# Patient Record
Sex: MALE | Race: WHITE | NOT HISPANIC OR LATINO | Employment: OTHER | URBAN - NONMETROPOLITAN AREA
[De-identification: names, ages, dates, MRNs, and addresses within clinical notes are randomized per-mention and may not be internally consistent; named-entity substitution may affect disease eponyms.]

---

## 2018-06-27 ENCOUNTER — HOSPITAL ENCOUNTER (EMERGENCY)
Facility: HOSPITAL | Age: 33
Discharge: HOME | End: 2018-06-27
Attending: EMERGENCY MEDICINE | Admitting: EMERGENCY MEDICINE

## 2018-06-27 VITALS
WEIGHT: 180 LBS | BODY MASS INDEX: 23.1 KG/M2 | DIASTOLIC BLOOD PRESSURE: 88 MMHG | HEART RATE: 85 BPM | OXYGEN SATURATION: 100 % | SYSTOLIC BLOOD PRESSURE: 131 MMHG | HEIGHT: 74 IN | TEMPERATURE: 98 F | RESPIRATION RATE: 18 BRPM

## 2018-06-27 DIAGNOSIS — T40.1X1A ACCIDENTAL OVERDOSE OF HEROIN, INITIAL ENCOUNTER (HCC): Primary | ICD-10-CM

## 2018-06-27 PROCEDURE — 99284 EMERGENCY DEPT VISIT MOD MDM: CPT

## 2021-07-22 ENCOUNTER — HOSPITAL ENCOUNTER (EMERGENCY)
Facility: HOSPITAL | Age: 36
Discharge: HOME | End: 2021-07-22
Attending: EMERGENCY MEDICINE | Admitting: EMERGENCY MEDICINE

## 2021-07-22 ENCOUNTER — APPOINTMENT (OUTPATIENT)
Dept: GENERAL RADIOLOGY | Facility: HOSPITAL | Age: 36
End: 2021-07-22

## 2021-07-22 VITALS
SYSTOLIC BLOOD PRESSURE: 150 MMHG | WEIGHT: 174.6 LBS | DIASTOLIC BLOOD PRESSURE: 84 MMHG | RESPIRATION RATE: 18 BRPM | HEIGHT: 74 IN | HEART RATE: 93 BPM | BODY MASS INDEX: 22.41 KG/M2 | TEMPERATURE: 98.2 F | OXYGEN SATURATION: 98 %

## 2021-07-22 DIAGNOSIS — S69.92XA HAND INJURY, LEFT, INITIAL ENCOUNTER: Primary | ICD-10-CM

## 2021-07-22 PROCEDURE — 99283 EMERGENCY DEPT VISIT LOW MDM: CPT

## 2021-07-22 PROCEDURE — 73130 X-RAY EXAM OF HAND: CPT

## 2021-07-22 RX ORDER — IBUPROFEN 800 MG/1
800 TABLET ORAL ONCE
Status: DISCONTINUED | OUTPATIENT
Start: 2021-07-22 | End: 2021-07-22

## 2021-07-22 RX ORDER — ACETAMINOPHEN 500 MG
1000 TABLET ORAL ONCE
Status: COMPLETED | OUTPATIENT
Start: 2021-07-22 | End: 2021-07-22

## 2021-07-22 RX ADMIN — ACETAMINOPHEN 1000 MG: 500 TABLET ORAL at 05:08

## 2021-07-22 NOTE — ED PROVIDER NOTES
"Subjective   36-year-old right-hand-dominant male presenting with left hand injury.  He states that about 12 hours ago he was \"messing around\" and fell onto his outstretched left hand.  He complains of pain at the left hand mostly second third metacarpals.  Pain is mild to moderate, worse with movement.  No alleviating factors.  No other injuries or complaints.          Review of Systems   Constitutional: Negative.    HENT: Negative.    Eyes: Negative.    Respiratory: Negative.    Cardiovascular: Negative.    Gastrointestinal: Negative.    Genitourinary: Negative.    Musculoskeletal: Positive for arthralgias.   Skin: Negative.    Neurological: Negative.    Psychiatric/Behavioral: Negative.        History reviewed. No pertinent past medical history.    No Known Allergies    History reviewed. No pertinent surgical history.    History reviewed. No pertinent family history.    Social History     Socioeconomic History   • Marital status: Single     Spouse name: Not on file   • Number of children: Not on file   • Years of education: Not on file   • Highest education level: Not on file   Tobacco Use   • Smoking status: Current Every Day Smoker     Packs/day: 1.00     Types: Cigarettes   Substance and Sexual Activity   • Alcohol use: Yes     Comment: rare   • Drug use: Not Currently     Comment: heroin           Objective   Physical Exam  Vitals reviewed.   Constitutional:       General: He is not in acute distress.     Appearance: Normal appearance. He is not ill-appearing, toxic-appearing or diaphoretic.   HENT:      Head: Normocephalic and atraumatic.      Right Ear: External ear normal.      Left Ear: External ear normal.      Nose: Nose normal.      Mouth/Throat:      Mouth: Mucous membranes are moist.      Pharynx: Oropharynx is clear.   Eyes:      Extraocular Movements: Extraocular movements intact.      Conjunctiva/sclera: Conjunctivae normal.      Pupils: Pupils are equal, round, and reactive to light. "   Cardiovascular:      Rate and Rhythm: Normal rate and regular rhythm.      Pulses: Normal pulses.      Heart sounds: Normal heart sounds.      Comments: 2+ radial pulses  Pulmonary:      Effort: Pulmonary effort is normal. No respiratory distress.      Breath sounds: Normal breath sounds.   Abdominal:      General: Bowel sounds are normal. There is no distension.      Tenderness: There is no abdominal tenderness.   Musculoskeletal:         General: No deformity. Normal range of motion.      Cervical back: Normal range of motion and neck supple.      Comments: Mild swelling and tenderness left second third metacarpals, all other extremities and joints are stable without tenderness   Skin:     General: Skin is warm and dry.      Capillary Refill: Capillary refill takes less than 2 seconds.      Findings: No rash.   Neurological:      General: No focal deficit present.      Mental Status: He is alert and oriented to person, place, and time.      Comments: Normal strength and sensation   Psychiatric:         Mood and Affect: Mood normal.         Behavior: Behavior normal.         Procedures           ED Course                                           MDM  Number of Diagnoses or Management Options  Hand injury, left, initial encounter  Diagnosis management comments: 36-year-old male with fall, hand injury.  Well-developed, well-nourished young man in no distress with exam as above.  His vital signs are normal.  He is neurovascular intact.  Will obtain x-ray and treat pain.  Disposition pending though anticipate discharge home.    DDx: Strain, sprain, fracture    X-ray per radiology is without acute findings.  Will discharge home with orthopedic follow-up if needed.  Supportive measures discussed.      Final diagnoses:   Hand injury, left, initial encounter          Richi Cooney MD  07/22/21 0602

## 2021-12-02 ENCOUNTER — HOSPITAL ENCOUNTER (EMERGENCY)
Facility: HOSPITAL | Age: 36
Discharge: LEFT BEFORE DISCHARGE | End: 2021-12-02
Attending: EMERGENCY MEDICINE | Admitting: EMERGENCY MEDICINE

## 2021-12-02 VITALS
HEIGHT: 74 IN | HEART RATE: 106 BPM | DIASTOLIC BLOOD PRESSURE: 132 MMHG | OXYGEN SATURATION: 95 % | RESPIRATION RATE: 12 BRPM | TEMPERATURE: 98.5 F | SYSTOLIC BLOOD PRESSURE: 145 MMHG | WEIGHT: 173.5 LBS | BODY MASS INDEX: 22.27 KG/M2

## 2021-12-02 DIAGNOSIS — T40.604A OPIATE OVERDOSE, UNDETERMINED INTENT, INITIAL ENCOUNTER (HCC): Primary | ICD-10-CM

## 2021-12-02 LAB
ALBUMIN SERPL-MCNC: 4.5 G/DL (ref 3.5–5.2)
ALBUMIN/GLOB SERPL: 1.3 G/DL
ALP SERPL-CCNC: 82 U/L (ref 39–117)
ALT SERPL W P-5'-P-CCNC: 24 U/L (ref 1–41)
AMPHET+METHAMPHET UR QL: POSITIVE
ANION GAP SERPL CALCULATED.3IONS-SCNC: 10.6 MMOL/L (ref 5–15)
APAP SERPL-MCNC: <5 MCG/ML (ref 0–30)
AST SERPL-CCNC: 23 U/L (ref 1–40)
BARBITURATES UR QL SCN: NEGATIVE
BASOPHILS # BLD AUTO: 0.08 10*3/MM3 (ref 0–0.2)
BASOPHILS NFR BLD AUTO: 0.6 % (ref 0–1.5)
BENZODIAZ UR QL SCN: NEGATIVE
BILIRUB SERPL-MCNC: 0.5 MG/DL (ref 0–1.2)
BUN SERPL-MCNC: 15 MG/DL (ref 6–20)
BUN/CREAT SERPL: 12.2 (ref 7–25)
CALCIUM SPEC-SCNC: 9.3 MG/DL (ref 8.6–10.5)
CANNABINOIDS SERPL QL: NEGATIVE
CHLORIDE SERPL-SCNC: 103 MMOL/L (ref 98–107)
CO2 SERPL-SCNC: 26.4 MMOL/L (ref 22–29)
COCAINE UR QL: NEGATIVE
CREAT SERPL-MCNC: 1.23 MG/DL (ref 0.76–1.27)
DEPRECATED RDW RBC AUTO: 43.8 FL (ref 37–54)
EOSINOPHIL # BLD AUTO: 0.33 10*3/MM3 (ref 0–0.4)
EOSINOPHIL NFR BLD AUTO: 2.3 % (ref 0.3–6.2)
ERYTHROCYTE [DISTWIDTH] IN BLOOD BY AUTOMATED COUNT: 13.3 % (ref 12.3–15.4)
ETHANOL BLD-MCNC: <10 MG/DL (ref 0–10)
ETHANOL UR QL: <0.01 %
GFR SERPL CREATININE-BSD FRML MDRD: 66 ML/MIN/1.73
GLOBULIN UR ELPH-MCNC: 3.4 GM/DL
GLUCOSE BLDC GLUCOMTR-MCNC: 94 MG/DL (ref 70–99)
GLUCOSE SERPL-MCNC: 102 MG/DL (ref 65–99)
HCT VFR BLD AUTO: 40.4 % (ref 37.5–51)
HGB BLD-MCNC: 13.8 G/DL (ref 13–17.7)
HOLD SPECIMEN: NORMAL
HOLD SPECIMEN: NORMAL
IMM GRANULOCYTES # BLD AUTO: 0.09 10*3/MM3 (ref 0–0.05)
IMM GRANULOCYTES NFR BLD AUTO: 0.6 % (ref 0–0.5)
LYMPHOCYTES # BLD AUTO: 5.19 10*3/MM3 (ref 0.7–3.1)
LYMPHOCYTES NFR BLD AUTO: 35.7 % (ref 19.6–45.3)
MCH RBC QN AUTO: 30.7 PG (ref 26.6–33)
MCHC RBC AUTO-ENTMCNC: 34.2 G/DL (ref 31.5–35.7)
MCV RBC AUTO: 89.8 FL (ref 79–97)
METHADONE UR QL SCN: NEGATIVE
MONOCYTES # BLD AUTO: 1.36 10*3/MM3 (ref 0.1–0.9)
MONOCYTES NFR BLD AUTO: 9.4 % (ref 5–12)
NEUTROPHILS NFR BLD AUTO: 51.4 % (ref 42.7–76)
NEUTROPHILS NFR BLD AUTO: 7.49 10*3/MM3 (ref 1.7–7)
NRBC BLD AUTO-RTO: 0 /100 WBC (ref 0–0.2)
OPIATES UR QL: NEGATIVE
OXYCODONE UR QL SCN: NEGATIVE
PLATELET # BLD AUTO: 490 10*3/MM3 (ref 140–450)
PMV BLD AUTO: 8.7 FL (ref 6–12)
POTASSIUM SERPL-SCNC: 3.7 MMOL/L (ref 3.5–5.2)
PROT SERPL-MCNC: 7.9 G/DL (ref 6–8.5)
RBC # BLD AUTO: 4.5 10*6/MM3 (ref 4.14–5.8)
SALICYLATES SERPL-MCNC: <0.3 MG/DL
SODIUM SERPL-SCNC: 140 MMOL/L (ref 136–145)
WBC NRBC COR # BLD: 14.54 10*3/MM3 (ref 3.4–10.8)
WHOLE BLOOD HOLD SPECIMEN: NORMAL
WHOLE BLOOD HOLD SPECIMEN: NORMAL

## 2021-12-02 PROCEDURE — 80179 DRUG ASSAY SALICYLATE: CPT | Performed by: EMERGENCY MEDICINE

## 2021-12-02 PROCEDURE — 80307 DRUG TEST PRSMV CHEM ANLYZR: CPT | Performed by: EMERGENCY MEDICINE

## 2021-12-02 PROCEDURE — 99283 EMERGENCY DEPT VISIT LOW MDM: CPT

## 2021-12-02 PROCEDURE — 80053 COMPREHEN METABOLIC PANEL: CPT | Performed by: EMERGENCY MEDICINE

## 2021-12-02 PROCEDURE — 82962 GLUCOSE BLOOD TEST: CPT

## 2021-12-02 PROCEDURE — 82077 ASSAY SPEC XCP UR&BREATH IA: CPT | Performed by: EMERGENCY MEDICINE

## 2021-12-02 PROCEDURE — 85025 COMPLETE CBC W/AUTO DIFF WBC: CPT | Performed by: EMERGENCY MEDICINE

## 2021-12-02 PROCEDURE — 80143 DRUG ASSAY ACETAMINOPHEN: CPT | Performed by: EMERGENCY MEDICINE

## 2021-12-02 RX ORDER — NALOXONE HYDROCHLORIDE 1 MG/ML
INJECTION INTRAMUSCULAR; INTRAVENOUS; SUBCUTANEOUS
Status: COMPLETED
Start: 2021-12-02 | End: 2021-12-02

## 2021-12-02 RX ORDER — SODIUM CHLORIDE 0.9 % (FLUSH) 0.9 %
10 SYRINGE (ML) INJECTION AS NEEDED
Status: DISCONTINUED | OUTPATIENT
Start: 2021-12-02 | End: 2021-12-02 | Stop reason: HOSPADM

## 2021-12-02 RX ADMIN — NALOXONE HYDROCHLORIDE 2 MG: 1 INJECTION PARENTERAL at 17:32

## 2021-12-02 NOTE — ED NOTES
After narcan patient is awake alert and speaking. Patient admitted to using heroin today     Meli Galan RN  12/02/21 2052

## 2021-12-02 NOTE — ED NOTES
Patient was brought to the EMS bay by a friend. Friend states concern for drug OD     Meli Galan RN  12/02/21 8850

## 2021-12-02 NOTE — ED NOTES
Patient eloped with significant other at approximately 1741. Patient still has 18 gauge IV in LAC. EPD notified     Meli Galan RN  12/02/21 8268

## 2021-12-02 NOTE — ED PROVIDER NOTES
Time: 5:33 PM EST  Arrived by: private car  Chief Complaint: overdose  History provided by: patient  History is limited by: N/A     History of Present Illness:  Patient is a 36 y.o. year old male that presents to the emergency department with an overdose on heroin.  He was dropped off by his friend.  He states that he did do a little bit of heroin.  Friend states that he was not breathing and his eyes would not open.      Drug Overdose  Location:  Home  Severity:  Moderate  Onset quality:  Sudden  Duration:  5 minutes  Timing:  Constant  Progression:  Worsening  Chronicity:  New  Relieved by:  Narcan  Associated symptoms: no abdominal pain, no chest pain, no congestion, no cough, no diarrhea, no fever, no headaches, no myalgias, no nausea, no rhinorrhea, no shortness of breath, no sore throat and no vomiting        Similar Symptoms Previously: no  Recently seen: no      Patient Care Team  Primary Care Provider: Provider, No Known    Past Medical History:     No Known Allergies  History reviewed. No pertinent past medical history.  History reviewed. No pertinent surgical history.  History reviewed. No pertinent family history.    Home Medications:  Prior to Admission medications    Not on File        Social History:   Social History     Tobacco Use   • Smoking status: Current Every Day Smoker     Packs/day: 1.00     Types: Cigarettes   • Smokeless tobacco: Not on file   Substance Use Topics   • Alcohol use: Yes     Comment: rare   • Drug use: Yes     Comment: heroin     Recent travel: no     Review of Systems:  Review of Systems   Constitutional: Negative for chills and fever.   HENT: Negative for congestion, rhinorrhea and sore throat.    Eyes: Negative for pain and visual disturbance.   Respiratory: Negative for apnea, cough, chest tightness and shortness of breath.    Cardiovascular: Negative for chest pain and palpitations.   Gastrointestinal: Negative for abdominal pain, diarrhea, nausea and vomiting.  "  Genitourinary: Negative for difficulty urinating and dysuria.   Musculoskeletal: Negative for joint swelling and myalgias.   Skin: Negative for color change.   Neurological: Negative for seizures and headaches.   Psychiatric/Behavioral: Negative.    All other systems reviewed and are negative.       Physical Exam:  BP (!) 145/132 (BP Location: Right arm, Patient Position: Sitting)   Pulse 106   Temp 98.5 °F (36.9 °C) (Oral)   Resp 12   Ht 188 cm (74\")   Wt 78.7 kg (173 lb 8 oz)   SpO2 95%   BMI 22.28 kg/m²     Physical Exam  Vitals and nursing note reviewed.   Constitutional:       General: He is not in acute distress.     Appearance: Normal appearance. He is not toxic-appearing.   HENT:      Head: Normocephalic and atraumatic.      Jaw: There is normal jaw occlusion.   Eyes:      General: Lids are normal.      Extraocular Movements: Extraocular movements intact.      Conjunctiva/sclera: Conjunctivae normal.      Comments: (+) pupil constriction   Cardiovascular:      Rate and Rhythm: Normal rate and regular rhythm.      Pulses: Normal pulses.      Heart sounds: Normal heart sounds.   Pulmonary:      Effort: Pulmonary effort is normal. No respiratory distress.      Breath sounds: Normal breath sounds. No wheezing or rhonchi.   Abdominal:      General: Abdomen is flat.      Palpations: Abdomen is soft.      Tenderness: There is no abdominal tenderness. There is no guarding or rebound.   Musculoskeletal:         General: Normal range of motion.      Cervical back: Normal range of motion and neck supple.      Right lower leg: No edema.      Left lower leg: No edema.   Skin:     General: Skin is warm and dry.   Neurological:      Mental Status: He is alert and oriented to person, place, and time. Mental status is at baseline.   Psychiatric:         Mood and Affect: Mood normal.                Medications in the Emergency Department:  Medications   sodium chloride 0.9 % flush 10 mL (has no administration in time " range)   Naloxone HCl (NARCAN) 2 MG/2ML injection  - ADS Override Pull (2 mg  Given 12/2/21 1732)        Labs  Lab Results (last 24 hours)     Procedure Component Value Units Date/Time    CBC & Differential [[id number]]  (Abnormal) Collected: 12/02/21 1728    Specimen: Blood Updated: 12/02/21 1735    Narrative:      The following orders were created for panel order CBC & Differential.  Procedure                               Abnormality         Status                     ---------                               -----------         ------                     CBC Auto Differential[[id number]]        Abnormal            Final result                 Please view results for these tests on the individual orders.    Comprehensive Metabolic Panel [[id number]]  (Abnormal) Collected: 12/02/21 1728    Specimen: Blood Updated: 12/02/21 1757     Glucose 102 mg/dL      BUN 15 mg/dL      Creatinine 1.23 mg/dL      Sodium 140 mmol/L      Potassium 3.7 mmol/L      Chloride 103 mmol/L      CO2 26.4 mmol/L      Calcium 9.3 mg/dL      Total Protein 7.9 g/dL      Albumin 4.50 g/dL      ALT (SGPT) 24 U/L      AST (SGOT) 23 U/L      Alkaline Phosphatase 82 U/L      Total Bilirubin 0.5 mg/dL      eGFR Non African Amer 66 mL/min/1.73      Globulin 3.4 gm/dL      A/G Ratio 1.3 g/dL      BUN/Creatinine Ratio 12.2     Anion Gap 10.6 mmol/L     Narrative:      GFR Normal >60  Chronic Kidney Disease <60  Kidney Failure <15      Acetaminophen Level [[id number]]  (Normal) Collected: 12/02/21 1728    Specimen: Blood Updated: 12/02/21 1757     Acetaminophen <5.0 mcg/mL     Ethanol [[id number]] Collected: 12/02/21 1728    Specimen: Blood Updated: 12/02/21 1757     Ethanol <10 mg/dL      Ethanol % <0.010 %     Narrative:      Ethanol (Plasma)  <10 Essentially Negative    Toxic Concentrations           mg/dL    Flushing, slowing of reflexes    Impaired visual activity         Depression of CNS              >100  Possible Coma                   >300       Salicylate Level [[id number]]  (Normal) Collected: 12/02/21 1728    Specimen: Blood Updated: 12/02/21 1757     Salicylate <0.3 mg/dL     CBC Auto Differential [[id number]]  (Abnormal) Collected: 12/02/21 1728    Specimen: Blood Updated: 12/02/21 1735     WBC 14.54 10*3/mm3      RBC 4.50 10*6/mm3      Hemoglobin 13.8 g/dL      Hematocrit 40.4 %      MCV 89.8 fL      MCH 30.7 pg      MCHC 34.2 g/dL      RDW 13.3 %      RDW-SD 43.8 fl      MPV 8.7 fL      Platelets 490 10*3/mm3      Neutrophil % 51.4 %      Lymphocyte % 35.7 %      Monocyte % 9.4 %      Eosinophil % 2.3 %      Basophil % 0.6 %      Immature Grans % 0.6 %      Neutrophils, Absolute 7.49 10*3/mm3      Lymphocytes, Absolute 5.19 10*3/mm3      Monocytes, Absolute 1.36 10*3/mm3      Eosinophils, Absolute 0.33 10*3/mm3      Basophils, Absolute 0.08 10*3/mm3      Immature Grans, Absolute 0.09 10*3/mm3      nRBC 0.0 /100 WBC     POC Glucose Once [[id number]]  (Normal) Collected: 12/02/21 1731    Specimen: Blood Updated: 12/02/21 1732     Glucose 94 mg/dL      Comment: Serial Number: [id number]:  [id number]       Urine Drug Screen - Urine, Clean Catch [[id number]] Collected: 12/02/21 1736    Specimen: Urine, Clean Catch Updated: 12/02/21 1742           Imaging:  No Radiology Exams Resulted Within Past 24 Hours    Procedures:  Procedures    Progress                            Medical Decision Making:  MDM  Number of Diagnoses or Management Options  Opiate overdose, undetermined intent, initial encounter ([facility])  Diagnosis management comments: Patient was given 2 mg of Narcan in the emergency department and had an immediate response with increasing alertness.  Patient has no hypoxia and has no respiratory distress.    This patient has left the emergency department or waiting room with no communication to myself, nursing or administrative staff. There was no opportunity to discuss the patient's decision to leave, provide medical advice or discuss  alternatives to. The staff has made efforts to locate patient without success.    Risk of Complications, Morbidity, and/or Mortality  Presenting problems: high  Management options: high    Patient Progress  Patient progress: stable       Final diagnoses:   Opiate overdose, undetermined intent, initial encounter ([facility])        Disposition:  ED Disposition     None          This medical record created using voice recognition software and may contain unintended errors.           Margareth Antunez MD  12/02/21 3760

## 2023-08-18 ENCOUNTER — APPOINTMENT (OUTPATIENT)
Dept: CT IMAGING | Facility: HOSPITAL | Age: 38
DRG: 917 | End: 2023-08-18

## 2023-08-18 ENCOUNTER — APPOINTMENT (OUTPATIENT)
Dept: GENERAL RADIOLOGY | Facility: HOSPITAL | Age: 38
DRG: 917 | End: 2023-08-18

## 2023-08-18 ENCOUNTER — HOSPITAL ENCOUNTER (INPATIENT)
Facility: HOSPITAL | Age: 38
LOS: 3 days | Discharge: HOME | DRG: 917 | End: 2023-08-21
Attending: EMERGENCY MEDICINE

## 2023-08-18 DIAGNOSIS — N28.9 ACUTE RENAL INSUFFICIENCY: ICD-10-CM

## 2023-08-18 DIAGNOSIS — E87.20 LACTIC ACIDOSIS: ICD-10-CM

## 2023-08-18 DIAGNOSIS — F15.929 METHAMPHETAMINE INTOXICATION: ICD-10-CM

## 2023-08-18 DIAGNOSIS — R41.82 ALTERED MENTAL STATUS, UNSPECIFIED ALTERED MENTAL STATUS TYPE: Primary | ICD-10-CM

## 2023-08-18 LAB
A-A DO2: 466.5 MMHG
A-A DO2: 88.7 MMHG
ALBUMIN SERPL-MCNC: 4.2 G/DL (ref 3.5–5.2)
ALBUMIN SERPL-MCNC: 5.1 G/DL (ref 3.5–5.2)
ALBUMIN/GLOB SERPL: 1.4 G/DL
ALBUMIN/GLOB SERPL: 1.5 G/DL
ALP SERPL-CCNC: 61 U/L (ref 39–117)
ALP SERPL-CCNC: 83 U/L (ref 39–117)
ALT SERPL W P-5'-P-CCNC: 20 U/L (ref 1–41)
ALT SERPL W P-5'-P-CCNC: 26 U/L (ref 1–41)
AMPHET+METHAMPHET UR QL: NEGATIVE
AMPHETAMINES UR QL: POSITIVE
ANION GAP SERPL CALCULATED.3IONS-SCNC: 11 MMOL/L (ref 5–15)
ANION GAP SERPL CALCULATED.3IONS-SCNC: 25.8 MMOL/L (ref 5–15)
APAP SERPL-MCNC: <5 MCG/ML (ref 0–30)
APTT PPP: 21.9 SECONDS (ref 70–100)
ARTERIAL PATENCY WRIST A: ABNORMAL
ARTERIAL PATENCY WRIST A: ABNORMAL
AST SERPL-CCNC: 22 U/L (ref 1–40)
AST SERPL-CCNC: 27 U/L (ref 1–40)
ATMOSPHERIC PRESS: 731 MMHG
ATMOSPHERIC PRESS: 733 MMHG
BACTERIA UR QL AUTO: ABNORMAL /HPF
BARBITURATES UR QL SCN: NEGATIVE
BASE EXCESS BLDA CALC-SCNC: -2.6 MMOL/L (ref 0–2)
BASE EXCESS BLDA CALC-SCNC: -21.4 MMOL/L (ref 0–2)
BASOPHILS # BLD MANUAL: 0.13 10*3/MM3 (ref 0–0.2)
BASOPHILS NFR BLD MANUAL: 1 % (ref 0–1.5)
BDY SITE: ABNORMAL
BDY SITE: ABNORMAL
BENZODIAZ UR QL SCN: NEGATIVE
BILIRUB SERPL-MCNC: 0.2 MG/DL (ref 0–1.2)
BILIRUB SERPL-MCNC: 0.4 MG/DL (ref 0–1.2)
BILIRUB UR QL STRIP: NEGATIVE
BUN SERPL-MCNC: 12 MG/DL (ref 6–20)
BUN SERPL-MCNC: 12 MG/DL (ref 6–20)
BUN/CREAT SERPL: 7.9 (ref 7–25)
BUN/CREAT SERPL: 9.8 (ref 7–25)
BUPRENORPHINE SERPL-MCNC: NEGATIVE NG/ML
CALCIUM SPEC-SCNC: 8.8 MG/DL (ref 8.6–10.5)
CALCIUM SPEC-SCNC: 9.8 MG/DL (ref 8.6–10.5)
CANNABINOIDS SERPL QL: NEGATIVE
CHLORIDE SERPL-SCNC: 106 MMOL/L (ref 98–107)
CHLORIDE SERPL-SCNC: 99 MMOL/L (ref 98–107)
CK SERPL-CCNC: 126 U/L (ref 20–200)
CK SERPL-CCNC: 304 U/L (ref 20–200)
CLARITY UR: ABNORMAL
CO2 SERPL-SCNC: 15.2 MMOL/L (ref 22–29)
CO2 SERPL-SCNC: 22 MMOL/L (ref 22–29)
COCAINE UR QL: NEGATIVE
COHGB MFR BLD: 0.5 % (ref 0–2)
COHGB MFR BLD: 0.5 % (ref 0–2)
COLOR UR: YELLOW
CREAT SERPL-MCNC: 1.23 MG/DL (ref 0.76–1.27)
CREAT SERPL-MCNC: 1.51 MG/DL (ref 0.76–1.27)
D-LACTATE SERPL-SCNC: 1.7 MMOL/L (ref 0.5–2)
D-LACTATE SERPL-SCNC: 12 MMOL/L (ref 0.5–2)
D-LACTATE SERPL-SCNC: 2.4 MMOL/L (ref 0.5–2)
D-LACTATE SERPL-SCNC: 2.8 MMOL/L (ref 0.5–2)
D-LACTATE SERPL-SCNC: 2.9 MMOL/L (ref 0.5–2)
DEPRECATED RDW RBC AUTO: 40.8 FL (ref 37–54)
DEPRECATED RDW RBC AUTO: 41.4 FL (ref 37–54)
EGFRCR SERPLBLD CKD-EPI 2021: 59.5 ML/MIN/1.73
EGFRCR SERPLBLD CKD-EPI 2021: 76.1 ML/MIN/1.73
EOSINOPHIL # BLD MANUAL: 0.27 10*3/MM3 (ref 0–0.4)
EOSINOPHIL NFR BLD MANUAL: 2 % (ref 0.3–6.2)
ERYTHROCYTE [DISTWIDTH] IN BLOOD BY AUTOMATED COUNT: 12 % (ref 12.3–15.4)
ERYTHROCYTE [DISTWIDTH] IN BLOOD BY AUTOMATED COUNT: 12.3 % (ref 12.3–15.4)
ETHANOL BLD-MCNC: 21 MG/DL (ref 0–10)
ETHANOL UR QL: 0.02 %
GLOBULIN UR ELPH-MCNC: 2.8 GM/DL
GLOBULIN UR ELPH-MCNC: 3.7 GM/DL
GLUCOSE BLDC GLUCOMTR-MCNC: 105 MG/DL (ref 70–130)
GLUCOSE BLDC GLUCOMTR-MCNC: 114 MG/DL (ref 70–130)
GLUCOSE BLDC GLUCOMTR-MCNC: 139 MG/DL (ref 70–130)
GLUCOSE SERPL-MCNC: 118 MG/DL (ref 65–99)
GLUCOSE SERPL-MCNC: 211 MG/DL (ref 65–99)
GLUCOSE UR STRIP-MCNC: NEGATIVE MG/DL
HCO3 BLDA-SCNC: 12.9 MMOL/L (ref 22–28)
HCO3 BLDA-SCNC: 23.2 MMOL/L (ref 22–28)
HCT VFR BLD AUTO: 40.9 % (ref 37.5–51)
HCT VFR BLD AUTO: 47.6 % (ref 37.5–51)
HCT VFR BLD CALC: 44.4 %
HCT VFR BLD CALC: 49.4 %
HGB BLD-MCNC: 14 G/DL (ref 13–17.7)
HGB BLD-MCNC: 16 G/DL (ref 13–17.7)
HGB UR QL STRIP.AUTO: ABNORMAL
HYALINE CASTS UR QL AUTO: ABNORMAL /LPF
INHALED O2 CONCENTRATION: 100 %
INHALED O2 CONCENTRATION: 80 %
INR PPP: 1.01 (ref 0.9–1.1)
KETONES UR QL STRIP: ABNORMAL
LEUKOCYTE ESTERASE UR QL STRIP.AUTO: NEGATIVE
LIPASE SERPL-CCNC: 19 U/L (ref 13–60)
LYMPHOCYTES # BLD MANUAL: 5.8 10*3/MM3 (ref 0.7–3.1)
LYMPHOCYTES NFR BLD MANUAL: 5 % (ref 5–12)
Lab: ABNORMAL
MAGNESIUM SERPL-MCNC: 2.4 MG/DL (ref 1.6–2.6)
MCH RBC QN AUTO: 31.1 PG (ref 26.6–33)
MCH RBC QN AUTO: 31.2 PG (ref 26.6–33)
MCHC RBC AUTO-ENTMCNC: 33.6 G/DL (ref 31.5–35.7)
MCHC RBC AUTO-ENTMCNC: 34.2 G/DL (ref 31.5–35.7)
MCV RBC AUTO: 91.1 FL (ref 79–97)
MCV RBC AUTO: 92.4 FL (ref 79–97)
METHADONE UR QL SCN: NEGATIVE
METHGB BLD QL: 0.7 % (ref 0–1.5)
METHGB BLD QL: 0.8 % (ref 0–1.5)
MODALITY: ABNORMAL
MODALITY: ABNORMAL
MONOCYTES # BLD: 0.67 10*3/MM3 (ref 0.1–0.9)
NEUTROPHILS # BLD AUTO: 6.61 10*3/MM3 (ref 1.7–7)
NEUTROPHILS NFR BLD MANUAL: 48 % (ref 42.7–76)
NEUTS BAND NFR BLD MANUAL: 1 % (ref 0–5)
NITRITE UR QL STRIP: NEGATIVE
NOTIFIED BY: ABNORMAL
NOTIFIED WHO: ABNORMAL
OPIATES UR QL: NEGATIVE
OXYCODONE UR QL SCN: NEGATIVE
OXYHGB MFR BLDV: 95.4 % (ref 94–99)
OXYHGB MFR BLDV: 99.5 % (ref 94–99)
PCO2 BLDA: 43 MM HG (ref 35–45)
PCO2 BLDA: 67.8 MM HG (ref 35–45)
PCO2 TEMP ADJ BLD: ABNORMAL MM[HG]
PCO2 TEMP ADJ BLD: ABNORMAL MM[HG]
PCP UR QL SCN: NEGATIVE
PEEP RESPIRATORY: 6 CM[H2O]
PH BLDA: 6.89 PH UNITS (ref 7.35–7.45)
PH BLDA: 7.34 PH UNITS (ref 7.35–7.45)
PH UR STRIP.AUTO: 5.5 [PH] (ref 5–8)
PH, TEMP CORRECTED: ABNORMAL
PH, TEMP CORRECTED: ABNORMAL
PLATELET # BLD AUTO: 270 10*3/MM3 (ref 140–450)
PLATELET # BLD AUTO: 282 10*3/MM3 (ref 140–450)
PMV BLD AUTO: 9.6 FL (ref 6–12)
PMV BLD AUTO: 9.9 FL (ref 6–12)
PO2 BLDA: 150 MM HG (ref 75–100)
PO2 BLDA: 415 MM HG (ref 75–100)
PO2 TEMP ADJ BLD: ABNORMAL MM[HG]
PO2 TEMP ADJ BLD: ABNORMAL MM[HG]
POTASSIUM SERPL-SCNC: 4.2 MMOL/L (ref 3.5–5.2)
POTASSIUM SERPL-SCNC: 4.4 MMOL/L (ref 3.5–5.2)
PROCALCITONIN SERPL-MCNC: 0.04 NG/ML (ref 0–0.25)
PROPOXYPH UR QL: NEGATIVE
PROT SERPL-MCNC: 7 G/DL (ref 6–8.5)
PROT SERPL-MCNC: 8.8 G/DL (ref 6–8.5)
PROT UR QL STRIP: ABNORMAL
PROTHROMBIN TIME: 13.8 SECONDS (ref 12.3–15.1)
RBC # BLD AUTO: 4.49 10*6/MM3 (ref 4.14–5.8)
RBC # BLD AUTO: 5.15 10*6/MM3 (ref 4.14–5.8)
RBC # UR STRIP: ABNORMAL /HPF
RBC MORPH BLD: NORMAL
REF LAB TEST METHOD: ABNORMAL
SALICYLATES SERPL-MCNC: <0.3 MG/DL
SAO2 % BLDCOA: 100.6 % (ref 94–100)
SAO2 % BLDCOA: 96.7 % (ref 94–100)
SCAN SLIDE: NORMAL
SET MECH RESP RATE: 18
SMALL PLATELETS BLD QL SMEAR: ADEQUATE
SODIUM SERPL-SCNC: 139 MMOL/L (ref 136–145)
SODIUM SERPL-SCNC: 140 MMOL/L (ref 136–145)
SP GR UR STRIP: 1.01 (ref 1–1.03)
SPERM URNS QL MICRO: ABNORMAL /HPF
SQUAMOUS #/AREA URNS HPF: ABNORMAL /HPF
TRICYCLICS UR QL SCN: NEGATIVE
TRIGL SERPL-MCNC: 134 MG/DL (ref 0–150)
TSH SERPL DL<=0.05 MIU/L-ACNC: 1.09 UIU/ML (ref 0.27–4.2)
UROBILINOGEN UR QL STRIP: ABNORMAL
VARIANT LYMPHS NFR BLD MANUAL: 43 % (ref 19.6–45.3)
VENTILATOR MODE: ABNORMAL
VENTILATOR MODE: AC
VT ON VENT VENT: 500 ML
WBC # UR STRIP: ABNORMAL /HPF
WBC MORPH BLD: NORMAL
WBC NRBC COR # BLD: 13.48 10*3/MM3 (ref 3.4–10.8)
WBC NRBC COR # BLD: 19.31 10*3/MM3 (ref 3.4–10.8)

## 2023-08-18 PROCEDURE — 83050 HGB METHEMOGLOBIN QUAN: CPT

## 2023-08-18 PROCEDURE — 83735 ASSAY OF MAGNESIUM: CPT | Performed by: FAMILY MEDICINE

## 2023-08-18 PROCEDURE — 72125 CT NECK SPINE W/O DYE: CPT

## 2023-08-18 PROCEDURE — 80053 COMPREHEN METABOLIC PANEL: CPT | Performed by: EMERGENCY MEDICINE

## 2023-08-18 PROCEDURE — 82805 BLOOD GASES W/O2 SATURATION: CPT

## 2023-08-18 PROCEDURE — 25010000002 HEPARIN (PORCINE) PER 1000 UNITS: Performed by: FAMILY MEDICINE

## 2023-08-18 PROCEDURE — 80053 COMPREHEN METABOLIC PANEL: CPT | Performed by: FAMILY MEDICINE

## 2023-08-18 PROCEDURE — 82550 ASSAY OF CK (CPK): CPT | Performed by: FAMILY MEDICINE

## 2023-08-18 PROCEDURE — 51702 INSERT TEMP BLADDER CATH: CPT

## 2023-08-18 PROCEDURE — 82948 REAGENT STRIP/BLOOD GLUCOSE: CPT

## 2023-08-18 PROCEDURE — 94799 UNLISTED PULMONARY SVC/PX: CPT

## 2023-08-18 PROCEDURE — 82550 ASSAY OF CK (CPK): CPT | Performed by: EMERGENCY MEDICINE

## 2023-08-18 PROCEDURE — 87040 BLOOD CULTURE FOR BACTERIA: CPT | Performed by: EMERGENCY MEDICINE

## 2023-08-18 PROCEDURE — 25010000002 CEFTRIAXONE SODIUM-DEXTROSE 1000-3.74 MG-%(50ML) RECONSTITUTED SOLUTION: Performed by: EMERGENCY MEDICINE

## 2023-08-18 PROCEDURE — 99285 EMERGENCY DEPT VISIT HI MDM: CPT

## 2023-08-18 PROCEDURE — 84443 ASSAY THYROID STIM HORMONE: CPT | Performed by: FAMILY MEDICINE

## 2023-08-18 PROCEDURE — 82375 ASSAY CARBOXYHB QUANT: CPT

## 2023-08-18 PROCEDURE — 25010000002 LORAZEPAM PER 2 MG

## 2023-08-18 PROCEDURE — 71045 X-RAY EXAM CHEST 1 VIEW: CPT

## 2023-08-18 PROCEDURE — 36415 COLL VENOUS BLD VENIPUNCTURE: CPT

## 2023-08-18 PROCEDURE — 81001 URINALYSIS AUTO W/SCOPE: CPT | Performed by: EMERGENCY MEDICINE

## 2023-08-18 PROCEDURE — 25010000002 PROPOFOL 1000 MG/100ML EMULSION

## 2023-08-18 PROCEDURE — 70486 CT MAXILLOFACIAL W/O DYE: CPT

## 2023-08-18 PROCEDURE — 70450 CT HEAD/BRAIN W/O DYE: CPT

## 2023-08-18 PROCEDURE — 82077 ASSAY SPEC XCP UR&BREATH IA: CPT | Performed by: EMERGENCY MEDICINE

## 2023-08-18 PROCEDURE — 93005 ELECTROCARDIOGRAM TRACING: CPT | Performed by: EMERGENCY MEDICINE

## 2023-08-18 PROCEDURE — 83690 ASSAY OF LIPASE: CPT | Performed by: EMERGENCY MEDICINE

## 2023-08-18 PROCEDURE — 74176 CT ABD & PELVIS W/O CONTRAST: CPT

## 2023-08-18 PROCEDURE — 25010000002 FENTANYL 10 MCG/1 ML NS: Performed by: EMERGENCY MEDICINE

## 2023-08-18 PROCEDURE — 80306 DRUG TEST PRSMV INSTRMNT: CPT | Performed by: EMERGENCY MEDICINE

## 2023-08-18 PROCEDURE — 85610 PROTHROMBIN TIME: CPT | Performed by: EMERGENCY MEDICINE

## 2023-08-18 PROCEDURE — 85027 COMPLETE CBC AUTOMATED: CPT | Performed by: FAMILY MEDICINE

## 2023-08-18 PROCEDURE — 94761 N-INVAS EAR/PLS OXIMETRY MLT: CPT

## 2023-08-18 PROCEDURE — 84145 PROCALCITONIN (PCT): CPT | Performed by: EMERGENCY MEDICINE

## 2023-08-18 PROCEDURE — 85025 COMPLETE CBC W/AUTO DIFF WBC: CPT | Performed by: EMERGENCY MEDICINE

## 2023-08-18 PROCEDURE — 85730 THROMBOPLASTIN TIME PARTIAL: CPT | Performed by: EMERGENCY MEDICINE

## 2023-08-18 PROCEDURE — 25010000002 FENTANYL 10 MCG/1 ML NS: Performed by: FAMILY MEDICINE

## 2023-08-18 PROCEDURE — 36600 WITHDRAWAL OF ARTERIAL BLOOD: CPT

## 2023-08-18 PROCEDURE — 84478 ASSAY OF TRIGLYCERIDES: CPT

## 2023-08-18 PROCEDURE — 80143 DRUG ASSAY ACETAMINOPHEN: CPT | Performed by: EMERGENCY MEDICINE

## 2023-08-18 PROCEDURE — 0BH17EZ INSERTION OF ENDOTRACHEAL AIRWAY INTO TRACHEA, VIA NATURAL OR ARTIFICIAL OPENING: ICD-10-PCS | Performed by: EMERGENCY MEDICINE

## 2023-08-18 PROCEDURE — 5A1945Z RESPIRATORY VENTILATION, 24-96 CONSECUTIVE HOURS: ICD-10-PCS | Performed by: FAMILY MEDICINE

## 2023-08-18 PROCEDURE — 94002 VENT MGMT INPAT INIT DAY: CPT

## 2023-08-18 PROCEDURE — 80179 DRUG ASSAY SALICYLATE: CPT | Performed by: EMERGENCY MEDICINE

## 2023-08-18 PROCEDURE — 85007 BL SMEAR W/DIFF WBC COUNT: CPT | Performed by: EMERGENCY MEDICINE

## 2023-08-18 PROCEDURE — 99223 1ST HOSP IP/OBS HIGH 75: CPT | Performed by: FAMILY MEDICINE

## 2023-08-18 PROCEDURE — 25010000002 PROPOFOL 10 MG/ML EMULSION: Performed by: FAMILY MEDICINE

## 2023-08-18 PROCEDURE — 83605 ASSAY OF LACTIC ACID: CPT | Performed by: EMERGENCY MEDICINE

## 2023-08-18 RX ORDER — ONDANSETRON 4 MG/1
4 TABLET, FILM COATED ORAL EVERY 6 HOURS PRN
Status: DISCONTINUED | OUTPATIENT
Start: 2023-08-18 | End: 2023-08-18 | Stop reason: ALTCHOICE

## 2023-08-18 RX ORDER — PROPOFOL 10 MG/ML
INJECTION, EMULSION INTRAVENOUS
Status: COMPLETED
Start: 2023-08-18 | End: 2023-08-18

## 2023-08-18 RX ORDER — LORAZEPAM 2 MG/ML
INJECTION INTRAMUSCULAR
Status: COMPLETED
Start: 2023-08-18 | End: 2023-08-18

## 2023-08-18 RX ORDER — ONDANSETRON 2 MG/ML
4 INJECTION INTRAMUSCULAR; INTRAVENOUS EVERY 6 HOURS PRN
Status: DISCONTINUED | OUTPATIENT
Start: 2023-08-18 | End: 2023-08-21 | Stop reason: HOSPADM

## 2023-08-18 RX ORDER — ACETAMINOPHEN 650 MG/1
650 SUPPOSITORY RECTAL EVERY 4 HOURS PRN
Status: DISCONTINUED | OUTPATIENT
Start: 2023-08-18 | End: 2023-08-21 | Stop reason: HOSPADM

## 2023-08-18 RX ORDER — HEPARIN SODIUM 5000 [USP'U]/ML
5000 INJECTION, SOLUTION INTRAVENOUS; SUBCUTANEOUS EVERY 12 HOURS SCHEDULED
Status: DISCONTINUED | OUTPATIENT
Start: 2023-08-18 | End: 2023-08-21 | Stop reason: HOSPADM

## 2023-08-18 RX ORDER — NITROGLYCERIN 0.4 MG/1
0.4 TABLET SUBLINGUAL
Status: DISCONTINUED | OUTPATIENT
Start: 2023-08-18 | End: 2023-08-21 | Stop reason: HOSPADM

## 2023-08-18 RX ORDER — AMOXICILLIN 250 MG
2 CAPSULE ORAL 2 TIMES DAILY
Status: DISCONTINUED | OUTPATIENT
Start: 2023-08-18 | End: 2023-08-18 | Stop reason: ALTCHOICE

## 2023-08-18 RX ORDER — SODIUM CHLORIDE 9 MG/ML
100 INJECTION, SOLUTION INTRAVENOUS CONTINUOUS
Status: DISCONTINUED | OUTPATIENT
Start: 2023-08-18 | End: 2023-08-19

## 2023-08-18 RX ORDER — ETOMIDATE 2 MG/ML
20 INJECTION INTRAVENOUS ONCE
Status: COMPLETED | OUTPATIENT
Start: 2023-08-18 | End: 2023-08-18

## 2023-08-18 RX ORDER — CEFTRIAXONE 1 G/50ML
1000 INJECTION, SOLUTION INTRAVENOUS ONCE
Status: COMPLETED | OUTPATIENT
Start: 2023-08-18 | End: 2023-08-18

## 2023-08-18 RX ORDER — BISACODYL 5 MG/1
5 TABLET, DELAYED RELEASE ORAL DAILY PRN
Status: DISCONTINUED | OUTPATIENT
Start: 2023-08-18 | End: 2023-08-18 | Stop reason: ALTCHOICE

## 2023-08-18 RX ORDER — BISACODYL 10 MG
10 SUPPOSITORY, RECTAL RECTAL DAILY PRN
Status: DISCONTINUED | OUTPATIENT
Start: 2023-08-18 | End: 2023-08-21 | Stop reason: HOSPADM

## 2023-08-18 RX ORDER — NOREPINEPHRINE BITARTRATE/D5W 8 MG/250ML
.02-.3 PLASTIC BAG, INJECTION (ML) INTRAVENOUS
Status: DISCONTINUED | OUTPATIENT
Start: 2023-08-18 | End: 2023-08-19

## 2023-08-18 RX ORDER — LORAZEPAM 2 MG/ML
2 INJECTION INTRAMUSCULAR ONCE
Status: COMPLETED | OUTPATIENT
Start: 2023-08-18 | End: 2023-08-18

## 2023-08-18 RX ORDER — MORPHINE SULFATE 2 MG/ML
2 INJECTION, SOLUTION INTRAMUSCULAR; INTRAVENOUS
Status: DISCONTINUED | OUTPATIENT
Start: 2023-08-18 | End: 2023-08-19

## 2023-08-18 RX ORDER — ROCURONIUM BROMIDE 10 MG/ML
100 INJECTION, SOLUTION INTRAVENOUS ONCE
Status: COMPLETED | OUTPATIENT
Start: 2023-08-18 | End: 2023-08-18

## 2023-08-18 RX ORDER — POLYETHYLENE GLYCOL 3350 17 G/17G
17 POWDER, FOR SOLUTION ORAL DAILY PRN
Status: DISCONTINUED | OUTPATIENT
Start: 2023-08-18 | End: 2023-08-21 | Stop reason: HOSPADM

## 2023-08-18 RX ORDER — SODIUM CHLORIDE 0.9 % (FLUSH) 0.9 %
10 SYRINGE (ML) INJECTION AS NEEDED
Status: DISCONTINUED | OUTPATIENT
Start: 2023-08-18 | End: 2023-08-21 | Stop reason: HOSPADM

## 2023-08-18 RX ORDER — DOCUSATE SODIUM 50 MG/5 ML
100 LIQUID (ML) ORAL 2 TIMES DAILY
Status: DISCONTINUED | OUTPATIENT
Start: 2023-08-18 | End: 2023-08-19

## 2023-08-18 RX ORDER — ACETAMINOPHEN 325 MG/1
650 TABLET ORAL EVERY 4 HOURS PRN
Status: DISCONTINUED | OUTPATIENT
Start: 2023-08-18 | End: 2023-08-21 | Stop reason: HOSPADM

## 2023-08-18 RX ORDER — CHLORHEXIDINE GLUCONATE 0.12 MG/ML
15 RINSE ORAL EVERY 12 HOURS SCHEDULED
Status: DISCONTINUED | OUTPATIENT
Start: 2023-08-18 | End: 2023-08-19

## 2023-08-18 RX ORDER — SODIUM CHLORIDE 0.9 % (FLUSH) 0.9 %
10 SYRINGE (ML) INJECTION EVERY 12 HOURS SCHEDULED
Status: DISCONTINUED | OUTPATIENT
Start: 2023-08-18 | End: 2023-08-21 | Stop reason: HOSPADM

## 2023-08-18 RX ORDER — FAMOTIDINE 10 MG/ML
20 INJECTION, SOLUTION INTRAVENOUS 2 TIMES DAILY
Status: DISCONTINUED | OUTPATIENT
Start: 2023-08-18 | End: 2023-08-19

## 2023-08-18 RX ORDER — SENNOSIDES 8.8 MG/5ML
10 LIQUID ORAL 2 TIMES DAILY
Status: DISCONTINUED | OUTPATIENT
Start: 2023-08-18 | End: 2023-08-18 | Stop reason: ALTCHOICE

## 2023-08-18 RX ORDER — SODIUM CHLORIDE 9 MG/ML
40 INJECTION, SOLUTION INTRAVENOUS AS NEEDED
Status: DISCONTINUED | OUTPATIENT
Start: 2023-08-18 | End: 2023-08-21 | Stop reason: HOSPADM

## 2023-08-18 RX ADMIN — Medication 10 ML: at 20:07

## 2023-08-18 RX ADMIN — Medication 250 MCG/HR: at 20:47

## 2023-08-18 RX ADMIN — LORAZEPAM 2 MG: 2 INJECTION, SOLUTION INTRAMUSCULAR; INTRAVENOUS at 01:24

## 2023-08-18 RX ADMIN — SODIUM CHLORIDE 100 ML/HR: 9 INJECTION, SOLUTION INTRAVENOUS at 20:07

## 2023-08-18 RX ADMIN — SODIUM BICARBONATE 125 MEQ: 84 INJECTION, SOLUTION INTRAVENOUS at 04:09

## 2023-08-18 RX ADMIN — SODIUM CHLORIDE 1000 ML: 9 INJECTION, SOLUTION INTRAVENOUS at 01:35

## 2023-08-18 RX ADMIN — ETOMIDATE 20 MG: 2 INJECTION, SOLUTION INTRAVENOUS at 01:31

## 2023-08-18 RX ADMIN — SENNOSIDES 10 ML: 8.8 LIQUID ORAL at 20:07

## 2023-08-18 RX ADMIN — PROPOFOL 20 MCG/KG/MIN: 10 INJECTION, EMULSION INTRAVENOUS at 01:46

## 2023-08-18 RX ADMIN — Medication 50 MCG/HR: at 02:15

## 2023-08-18 RX ADMIN — FAMOTIDINE 20 MG: 10 INJECTION INTRAVENOUS at 08:22

## 2023-08-18 RX ADMIN — SENNOSIDES 10 ML: 8.8 LIQUID ORAL at 08:23

## 2023-08-18 RX ADMIN — DOCUSATE SODIUM 100 MG: 50 LIQUID ORAL at 08:23

## 2023-08-18 RX ADMIN — SODIUM CHLORIDE 100 ML/HR: 9 INJECTION, SOLUTION INTRAVENOUS at 10:45

## 2023-08-18 RX ADMIN — CEFTRIAXONE 1000 MG: 1 INJECTION, SOLUTION INTRAVENOUS at 02:28

## 2023-08-18 RX ADMIN — ROCURONIUM BROMIDE 100 MG: 10 INJECTION, SOLUTION INTRAVENOUS at 01:32

## 2023-08-18 RX ADMIN — PROPOFOL 30 MCG/KG/MIN: 10 INJECTION, EMULSION INTRAVENOUS at 15:57

## 2023-08-18 RX ADMIN — PROPOFOL 30 MCG/KG/MIN: 10 INJECTION, EMULSION INTRAVENOUS at 08:22

## 2023-08-18 RX ADMIN — LORAZEPAM 2 MG: 2 INJECTION INTRAMUSCULAR at 01:24

## 2023-08-18 RX ADMIN — SODIUM CHLORIDE 1000 ML: 9 INJECTION, SOLUTION INTRAVENOUS at 15:58

## 2023-08-18 RX ADMIN — DOCUSATE SODIUM 100 MG: 50 LIQUID ORAL at 20:07

## 2023-08-18 RX ADMIN — HEPARIN SODIUM 5000 UNITS: 5000 INJECTION INTRAVENOUS; SUBCUTANEOUS at 08:23

## 2023-08-18 RX ADMIN — FAMOTIDINE 20 MG: 10 INJECTION INTRAVENOUS at 20:07

## 2023-08-18 RX ADMIN — PROPOFOL 30 MCG/KG/MIN: 10 INJECTION, EMULSION INTRAVENOUS at 15:37

## 2023-08-18 RX ADMIN — 0.12% CHLORHEXIDINE GLUCONATE 15 ML: 1.2 RINSE ORAL at 20:07

## 2023-08-18 RX ADMIN — Medication 10 ML: at 08:23

## 2023-08-18 RX ADMIN — HEPARIN SODIUM 5000 UNITS: 5000 INJECTION INTRAVENOUS; SUBCUTANEOUS at 20:08

## 2023-08-18 RX ADMIN — NOREPINEPHRINE BITARTRATE 0.02 MCG/KG/MIN: 8 INJECTION, SOLUTION INTRAVENOUS at 18:19

## 2023-08-18 RX ADMIN — PROPOFOL 40 MCG/KG/MIN: 10 INJECTION, EMULSION INTRAVENOUS at 20:07

## 2023-08-18 RX ADMIN — 0.12% CHLORHEXIDINE GLUCONATE 15 ML: 1.2 RINSE ORAL at 08:23

## 2023-08-18 NOTE — CASE MANAGEMENT/SOCIAL WORK
Discharge Planning Assessment  [facility]     Patient Name: Ramon Reeves  MRN: [id number]  Today's Date: 8/18/2023    Admit Date: 8/18/2023    Plan: DCP   Discharge Needs Assessment       Row Name 08/18/23 1111       Living Environment    People in Home parent(s)    Current Living Arrangements home    Potentially Unsafe Housing Conditions none    Primary Care Provided by self    Provides Primary Care For no one    Family Caregiver if Needed parent(s)    Quality of Family Relationships unable to assess    Able to Return to Prior Arrangements yes       Resource/Environmental Concerns    Transportation Concerns none       Transition Planning    Patient/Family Anticipates Transition to home with family    Patient/Family Anticipated Services at Transition     Transportation Anticipated family or friend will provide       Discharge Needs Assessment    Readmission Within the Last 30 Days no previous admission in last 30 days    Equipment Currently Used at Home none    Concerns to be Addressed no discharge needs identified    Anticipated Changes Related to Illness none    Provided Post Acute Provider List? N/A    Provided Post Acute Provider Quality & Resource List? N/A                   Discharge Plan       Row Name 08/18/23 1112       Plan    Plan DCP    Patient/Family in Agreement with Plan yes    Provided Post Acute Provider List? N/A    Provided Post Acute Provider Quality & Resource List? N/A    Plan Comments JOHN met with Pt. and his mother and friend at bedside to complete DCP. Pt. mother completed DCP questions as Pt. is on a vent and unable to respond. Pt. mother stated that Pt. lives at home with her and he is currently on house arrest but Pt. is able to return home with her at discharge. Pt. mother stated that the police are aware that Pt. is a Pt. here in the hospital. Pt. does not have a POA and uses [facility]. Pt. plans for discharge are still pending Pt. progress. JOHN was also notifed that Pt.  does not have insurance from Pt. mother and SW emailed [employer] for them to help assist Pt. and his mother apply for Medicaid. SW/CM will continue to follow for discharge planning.                  Continued Care and Services - Admitted Since 8/18/2023    Coordination has not been started for this encounter.          Demographic Summary       Row Name 08/18/23 1104       General Information    Admission Type inpatient    Arrived From emergency department    Referral Source admission list    Reason for Consult discharge planning    Preferred Language English       Contact Information    Permission Granted to Share Info With     Contact Information Obtained for                    Functional Status       Row Name 08/18/23 1106       Functional Status    Usual Activity Tolerance good    Current Activity Tolerance moderate       Physical Activity    On average, how many days per week do you engage in moderate to strenuous exercise (like a brisk walk)? 0 days    On average, how many minutes do you engage in exercise at this level? 0 min    Number of minutes of exercise per week 0       Assessment of Health Literacy    How often do you have someone help you read hospital materials? Sometimes    How often do you have problems learning about your medical condition because of difficulty understanding written information? Sometimes    How often do you have a problem understanding what is told to you about your medical condition? Sometimes    How confident are you filling out medical forms by yourself? Somewhat    Health Literacy Moderate       Functional Status, IADL    Medications independent    Meal Preparation independent    Housekeeping independent    Laundry independent    Shopping independent       Mental Status Summary    Recent Changes in Mental Status/Cognitive Functioning no changes       Employment/    Employment Status unemployed    Current or Previous Occupation not applicable                    Psychosocial       Row Name 08/18/23 1110       Values/Beliefs    Spiritual, Cultural Beliefs, Alevism Practices, Values that Affect Care no       Coping/Stress    Major Change/Loss/Stressor unable to assess    Sources of Support unable to assess    Reaction to Health Status unable to assess    Understanding of Condition and Treatment unable to assess       Developmental Stage (Eriksson's)    Developmental Stage Stage 7 (35-65 years/Middle Adulthood) Generativity vs. Stagnation                   Abuse/Neglect    No documentation.                  Legal    No documentation.                  Substance Abuse    No documentation.                  Patient Forms    No documentation.                     Lenard Davis

## 2023-08-18 NOTE — CASE MANAGEMENT/SOCIAL WORK
Case Management/Social Work    Patient Name:  Ramon Reeves  YOB: 1985  MRN: [id number]  Admit Date:  8/18/2023      SW called and left a VM for Pt. Mother to complete DCP. SW will call and follow up with Pt. Mother to complete DCP. SW/CM will continue to follow for discharge planning.       Electronically signed by:  Lenard Davis  08/18/23 10:23 EDT

## 2023-08-18 NOTE — H&P
[facility]   HISTORY AND PHYSICAL      Name:  Ramon Reeves   Age:  38 y.o.  Sex:  male  :  1985  MRN:  [id number]   Visit Number:  [id number]  Admission Date:  2023  Date Of Service:  23  Primary Care Physician:  Provider, No Known    Chief Complaint:     Altered mental status, agitation    History Of Presenting Illness:      The patient is an apparent 38-year-old with a history of polysubstance abuse, alcohol use, tobacco use, who had presented via EMS after being found altered mental status and apparently unresponsive.  History obtained primarily from ER record.  Patient currently intubated and on sedation at time of visit.  Per report, EMS had been called out to the home due to patient being found unresponsive by his mother.  She had administered up to 12 mg of intranasal Narcan, followed by 2 more milligrams of Narcan via EMS.  He had presented apparently in distress, agitated, combative, and severely altered.  He was subsequently sedated and intubated and is currently on mechanical ventilation.  RN in the ER and noted mother stated that patient had been found on the floor and appeared to have fallen as well.  Mother also noted that patient may have intentionally took something to harm himself.    His vital signs were demonstrating severe tachycardia, hypertension, and he was febrile up to 102.7.  His labs were notable for elevated creatinine of 1.51 CO2 15.  INR of 1.  Lactic acid was 12.  Ethanol of 0.021.  White blood cell count 13,000 hemoglobin 16 platelets 270 procalcitonin 0.04.  Negative salicylate, acetaminophen.  .  ABG with pH 6.87 PCO2 of 16.8 PO2 150 bicarb 12.9.  Urinalysis with moderate blood and greater than 300 protein.  UDS positive for methamphetamine.  Patient went multiple imaging modalities including CT scan and pelvis with contrast which was unremarkable.  CT maxillofacial without contrast was reportedly unremarkable.  CT scan of the head  without contrast was unremarkable.  CT scan of the cervical spine was unremarkable.    Review Of Systems:    All systems were reviewed and negative except as mentioned in history of presenting illness, assessment and plan.    Past Medical History: Patient  has no past medical history on file.    Past Surgical History: Patient  has no past surgical history on file.    Social History: Patient  reports that he has been smoking cigarettes. He has been smoking an average of 1 pack per day. He does not have any smokeless tobacco history on file. He reports current alcohol use. He reports current drug use.    Family History:  Patient's family history has been reviewed and found to be noncontributory.     Allergies:      Patient has no known allergies.    Home Medications:    Prior to Admission Medications       None          ED Medications:    Medications   propofol (DIPRIVAN) infusion 10 mg/mL 100 mL (20 mcg/kg/min x 77.1 kg Intravenous Currently Infusing 8/18/23 0221)   fentaNYL 2500 mcg/250 mL NS infusion (100 mcg/hr Intravenous Currently Infusing 8/18/23 0221)   sodium bicarbonate 8.4 % 125 mEq in dextrose (D5W) 5 % 1,000 mL infusion (greater than 75 mEq) (125 mEq Intravenous New Bag 8/18/23 0409)   sodium chloride 0.9 % bolus 1,000 mL (0 mL Intravenous Stopped 8/18/23 0205)   LORazepam (ATIVAN) injection 2 mg (2 mg Intravenous Not Given 8/18/23 0335)   etomidate (AMIDATE) injection 20 mg (20 mg Intravenous Given 8/18/23 0131)   rocuronium (ZEMURON) injection 100 mg (100 mg Intravenous Given 8/18/23 0132)   cefTRIAXone (ROCEPHIN) IVPB 1000 mg/50ml dextrose (premix) (0 mg Intravenous Stopped 8/18/23 0258)     Vital Signs:  Temp:  [102.7 øF (39.3 øC)] 102.7 øF (39.3 øC)  Heart Rate:  [101-158] 101  Resp:  [18-20] 20  BP: (112-196)/() 113/84        08/18/23  0143   Weight: 77.1 kg (170 lb)     Body mass index is 21.83 kg/mý.    Physical Exam:     Most recent vital Signs: /84   Pulse 101   Temp (!) 102.7 øF  (39.3 øC) (Axillary)   Resp 20   Wt 77.1 kg (170 lb)   SpO2 98%   BMI 21.83 kg/mý     Physical Exam  Constitutional:       Appearance: He is ill-appearing and diaphoretic.      Comments: Intubated, sedated, ill-appearing   HENT:      Head: Normocephalic and atraumatic.      Nose: Nose normal.      Mouth/Throat:      Mouth: Mucous membranes are dry.   Eyes:      Extraocular Movements: Extraocular movements intact.      Pupils: Pupils are equal, round, and reactive to light.      Comments: Pupils are sluggish but reactive bilaterally and dilated   Cardiovascular:      Rate and Rhythm: Regular rhythm. Tachycardia present.      Pulses: Normal pulses.      Heart sounds: No murmur heard.    No gallop.   Pulmonary:      Breath sounds: Normal breath sounds. No rales.      Comments: ET tube in place  Abdominal:      General: Abdomen is flat. Bowel sounds are normal. There is no distension.      Tenderness: There is no abdominal tenderness. There is no guarding.   Musculoskeletal:         General: No signs of injury.      Right lower leg: No edema.      Left lower leg: No edema.   Skin:     General: Skin is warm.      Capillary Refill: Capillary refill takes less than 2 seconds.      Comments: Multiple tattoos noted on the extremities and chest, none appear new.   Neurological:      Comments: Unable to assess fully.  Currently intubated and sedated.  Per RN was some concern for posturing on arrival.  He was not following commands on arrival       Laboratory data:    I have reviewed the labs done in the emergency room.    Results from last 7 days   Lab Units 08/18/23  0125   SODIUM mmol/L 140   POTASSIUM mmol/L 4.2   CHLORIDE mmol/L 99   CO2 mmol/L 15.2*   BUN mg/dL 12   CREATININE mg/dL 1.51*   CALCIUM mg/dL 9.8   BILIRUBIN mg/dL 0.4   ALK PHOS U/L 83   ALT (SGPT) U/L 26   AST (SGOT) U/L 22   GLUCOSE mg/dL 211*     Results from last 7 days   Lab Units 08/18/23  0125   WBC 10*3/mm3 13.48*   HEMOGLOBIN g/dL 16.0    HEMATOCRIT % 47.6   PLATELETS 10*3/mm3 270     Results from last 7 days   Lab Units 08/18/23  0125   INR  1.01     Results from last 7 days   Lab Units 08/18/23  0125   CK TOTAL U/L 126             Results from last 7 days   Lab Units 08/18/23  0125   LIPASE U/L 19     Results from last 7 days   Lab Units 08/18/23  0154   PH, ARTERIAL pH units 6.887*   PO2 ART mm Hg 150.0*   PCO2, ARTERIAL mm Hg 67.8*   HCO3 ART mmol/L 12.9*     Results from last 7 days   Lab Units 08/18/23  0201   COLOR UA  Yellow   GLUCOSE UA  Negative   KETONES UA  Trace*   LEUKOCYTES UA  Negative   PH, URINE  5.5   BILIRUBIN UA  Negative   UROBILINOGEN UA  0.2 E.U./dL   RBC UA /HPF 3-5*   WBC UA /HPF None Seen       Pain Management Panel  More data may exist         Latest Ref Rng & Units 8/18/2023 12/2/2021   Pain Management Panel   Amphetamine, Urine Qual Negative Negative  -   Barbiturates Screen, Urine Negative Negative  Negative    Benzodiazepine Screen, Urine Negative Negative  Negative    Buprenorphine, Screen, Urine Negative Negative  -   Cocaine Screen, Urine Negative Negative  Negative    Methadone Screen , Urine Negative Negative  Negative    Methamphetamine, Ur Negative Positive  -       EKG:      EKG appears to show a sinus rhythm, rate in the 160 range, no ST elevations.    Radiology:    CT Abdomen Pelvis Without Contrast    Result Date: 8/18/2023  FINAL REPORT TECHNIQUE: null CLINICAL HISTORY: Sepsis COMPARISON: null FINDINGS: CT abdomen and pelvis without contrast Comparison: None Findings: There is mild bibasilar atelectasis. There is a small calcified granuloma in the left lower lobe. The liver, gallbladder, pancreas, spleen, adrenal glands, and kidneys are unremarkable. There is an OG tube in the stomach. The appendix is normal. Remainder of the gastrointestinal tract is unremarkable. There is no free fluid or fluid collection. There  is no free air. Mcgill catheter is in a decompressed bladder. Aorta is normal diameter.  There is no lymphadenopathy. There is mild L5-S1 degenerative disc disease. There is no fracture or suspicious lytic or sclerotic lesion.     Impression: No acute abnormality in the abdomen or pelvis. Authenticated and Electronically Signed by Benjamin Mckay MD on 08/18/2023 04:09:20 AM    CT Head Without Contrast    Result Date: 8/18/2023  FINAL REPORT TECHNIQUE: null CLINICAL HISTORY: overdose, ams, ?posturing COMPARISON: null FINDINGS: CT head without contrast Comparison: CT/SR - CT MAXILLOFACIAL WO CONT - 08/18/2023 02:19 AM EDT Findings: There are prominent perivascular spaces in the lower basal ganglia. There is no acute intracranial hemorrhage. Ventricles are of normal size and shape. No mass effect or midline shift is present. The gray-white matter differentiation appears normal. No fractures are identified.     Impression: No acute intracranial abnormality. Authenticated and Electronically Signed by Benjamin Mckay MD on 08/18/2023 04:00:52 AM    CT Cervical Spine Without Contrast    Result Date: 8/18/2023  FINAL REPORT TECHNIQUE: null CLINICAL HISTORY: fall, unreponsive COMPARISON: null FINDINGS: CT cervical spine without contrast Comparison: None Findings: The alignment is normal. There is no fracture. Disc spaces are preserved. There are multiple small anterior osteophytes. There is no evidence of central canal or neuroforaminal stenosis. There is an endotracheal tube and OG tube.     Impression: No evidence of cervical spine injury. Authenticated and Electronically Signed by Benjamin Mckay MD on 08/18/2023 04:03:56 AM    XR Chest 1 View    Result Date: 8/18/2023  FINAL REPORT TECHNIQUE: null CLINICAL HISTORY: overdose COMPARISON: null FINDINGS: 1 view chest x-ray Comparison: None Findings: Distal tip of the endotracheal tube is 3.6 cm superior to the jarrod. Enteric tube with distal tip and gastric side port overlying the expected location of the stomach lumen. No consolidation or large pleural  effusion. No pneumothorax. Heart size is normal. No acute fracture.     IMPRESSION: 1. Distal tip of the endotracheal tube is 3.6 cm superior to the jarrod. 2. Enteric tube with distal tip/gastric side port overlying the expected location of the stomach lumen. 3. Clear lungs. Authenticated and Electronically Signed by Fortino Last DO on 08/18/2023 02:10:45 AM    CT Maxillofacial Without Contrast    Result Date: 8/18/2023  FINAL REPORT TECHNIQUE: null CLINICAL HISTORY: fall COMPARISON: null FINDINGS: CT maxillofacial without contrast Comparison: None Findings: There is no facial fracture. There is no hematoma. Orbits appear normal. Mastoids are clear. There is mucous and mucosal thickening throughout the paranasal sinuses consistent with sinusitis. There is an endotracheal tube and OG tube. There are multiple absent teeth.     Impression: 1. No facial fracture. 2. Mucous and mucosal thickening throughout the paranasal sinuses consistent with sinusitis. Authenticated and Electronically Signed by Benjamin Mckay MD on 08/18/2023 04:02:31 AM     Assessment:    Altered mental status, POA  Suspected acute methamphetamine overdose, POA  Acute respiratory failure with hypoxia,, POA  BIANCA, POA  Lactic acidosis, POA  Metabolic acidosis, POA  Polysubstance abuse  Alcohol abuse    Plan:    Admit to ICU    Altered mental status/respiratory failure:  Suspect related to methamphetamine toxicity, unsure if any other potential substances involved.  Currently on sedation with fentanyl and propofol.  Mental status will be assessed with weaning of sedation when appropriate.  No obvious infectious process.  Unsure if aspirated prior to arrival, consider further antibiotics.    In addition, unknown exactly how long patient has been in current condition, cannot rule out potential degree of anoxic brain injury possibility as well.    BIANCA:  Complicated with metabolic acidosis likely due to acute illness/drug toxicity.  Receiving IV fluid  resuscitation.  Will avoid nephrotoxic agents.  Placed on sodium bicarb drip for now pending repeat blood gas and BMP.  Urine output monitor closely.    Metabolic acidosis/lactic acidosis:  Most likely due to polysubstance abuse and overdose.  Monitor with treatment    Polysubstance use:  Complicates all expected care.    Lines: Mcgill catheter placed 8/18/2023.  ET tube placed 8/18/2023.  OG tube placed 8/18/2023.    Patient otherwise meets inpatient level of care and anticipate greater than 2 midnight stay.  Further recommendation depend upon clinical course.     Risk Assessment: High  DVT Prophylaxis: Heparin  Code Status: Full  Diet: N.p.o.    Advance Care Planning   ACP discussion was held with the patient during this visit. Patient does not have an advance directive, declines further assistance.           Jessy Wiley DO  08/18/23  04:22 EDT    Dictated utilizing Dragon dictation.

## 2023-08-18 NOTE — ED PROVIDER NOTES
HPI: Ramon Reeves is a 38 y.o. male who presents to the emergency department complaining of overdose, altered mental status.  Apparently patient's mother called because he was found facedown unresponsive.  Per report he received 12 mg of intranasal Narcan by his mother followed by another 2 mg of Narcan by EMS.  Patient arrives here severely agitated, combative and uncooperative.      REVIEW OF SYSTEMS: All other systems reviewed and are negative     PAST MEDICAL HISTORY:   No past medical history on file.     FAMILY HISTORY:   No family history on file.     SOCIAL HISTORY:   Social History     Socioeconomic History    Marital status: Single   Tobacco Use    Smoking status: Every Day     Packs/day: 1.00     Types: Cigarettes   Substance and Sexual Activity    Alcohol use: Yes     Comment: rare    Drug use: Yes     Comment: heroin        SURGICAL HISTORY:   No past surgical history on file.     ALLERGIES: Patient has no known allergies.       PHYSICAL EXAM:   VITAL SIGNS:   Vitals:    08/18/23 0415   BP:    Pulse: 101   Resp: 20   Temp:    SpO2: 98%      CONSTITUTIONAL: Ill-appearing  HENT: Atraumatic, normocephalic, oral mucosa moist, airway patent.  Patient continually spitting bloody sputum.  Nares patent without drainage. External ears normal.   EYES: Conjunctivae clear, EOMI, PERRL   NECK: Trachea midline, nontender, supple   CARDIOVASCULAR: Tachycardic normal rhythm.  PULMONARY/CHEST: Tachypnea. Clear to auscultation, no rhonchi, wheezes, or rales.  ABDOMINAL: Nondistended, soft, nontender, no rebound or guarding.  NEUROLOGIC: Moves all extremities non purposefully, pupils midsize and reactive, patient with intermittent stiffening/posturing of the upper extremities  EXTREMITIES: No clubbing, cyanosis, or edema   SKIN: Warm, Dry, No erythema, No rash       ED COURSE / MEDICAL DECISION MAKING:     Ramon Reeves is a 38 y.o. male who presents to the emergency department for evaluation of apparent overdose.   Ill-appearing man who is combative, agitated.  Vital signs are notable for hypertension, tachycardia, febrile.  Oxygen saturation is normal on room air at 94%.  Will attempt to give patient benzodiazepines for sedation.  We will send labs, chest x-ray and head CT.  We will also start IV fluids.  Disposition pending.    Differential diagnosis includes overdose, intoxication, metabolic derangement, ICH, anoxic brain injury among other etiologies.    EKG interpreted by me: Sinus tachycardia, nonspecific ST/T changes, this is an abnormal EKG    0145  Patient with no improvement after Ativan, became increasingly combative.  Decision made to intubate.  See procedure note below.  Disposition is likely to the hospital.    0420  Lab work notable for mild renal insufficiency, lactic acidosis.  Urine drug screen positive for methamphetamines.  CT imaging reveals no acute findings, mention made of sinusitis.  Discussed with Dr. Wiley for admission.  Patient's mother has been updated.    Intubation procedure note.  Informed consent not obtained due to emergent nature procedure.  Patient preoxygenated with bag valve mask.  Patient was given etomidate and rocuronium for RSI.  Using GlideScope view was obtained of the cords.  8.0 ET tube was then placed through the cords.  There is positive color change on end-tidal CO2 detector.  Patient had bilateral breath sounds.  Tube secured at 24 cm at the lip.  Chest x-ray reveals tube in appropriate position.  Patient tolerated suture well no apparent acute complications.    70 minutes of critical care provided. This time excludes other billable procedures. Time does include preparation of documents, medical consultations, review of old records, and direct bedside care. Patient is at high risk for life-threatening deterioration due to altered mental status, renal insufficiency, lactic acidosis.       Final diagnoses:   Altered mental status, unspecified altered mental status type    Methamphetamine intoxication   Acute renal insufficiency   Lactic acidosis        Richi Cooney MD  08/18/23 1882

## 2023-08-18 NOTE — CONSULTS
"Adult Nutrition  Assessment/PES    Patient Name:  Ramon Reeves  YOB: 1985  MRN: [id number]  Admit Date:  8/18/2023    Assessment Date:  8/18/2023    Comments:     Pt sedated/intubated w/ propofol running @ 13.88mL/hr x 24hrs providing ~366kcals/day. Pt w/ OG-tube and need for enteral nutrition recommendations.     Rec#1: Initiate Peptamen AF @ 20mL/hr and advance 10mL q8hrs to goal rate of 63mL/hr x 24hrs.     Rec#2: Free water flush 140mL q4hrs.     Total TF regimen to provide: 2180kcals, 115g protein, and 2068mL fluid/day.     RD to follow-up.      Reason for Assessment       Row Name 08/18/23 1043          Reason for Assessment    Reason For Assessment identified at risk by screening criteria;per organizational policy;nurse/nurse practitioner consult     Diagnosis substance use/abuse     Identified At Risk by Screening Criteria tube feeding or parenteral nutrition                      Labs/Tests/Procedures/Meds       Row Name 08/18/23 1044          Labs/Procedures/Meds    Lab Results Reviewed reviewed, pertinent        Medications    Pertinent Medications Reviewed reviewed, pertinent     Pertinent Medications Comments propofol, fentanyl                    Physical Findings       Row Name 08/18/23 1044          Physical Findings    Overall Physical Appearance normal wt, intubated/sedated     Enteral Access Devices orogastric tube                    Estimated/Assessed Needs - Anthropometrics       Row Name 08/18/23 1045 08/18/23 0600       Anthropometrics    Weight -- 87.1 kg (192 lb 0.3 oz)    VE (L/min) for Calculation 5 --    Height for Calculation 1.88 m (6' 2\") --    Weight for Calculation 87.1 kg (192 lb) --       Estimated/Assessed Needs    Additional Documentation Modified [place] State Equation (Group);[place] State Equation (Group);Protein Requirements (Group);Fluid Requirements (Group) --       Estimated Calorie Needs    Estimated Calorie Requirement (kcal/day) 2178 --       [place] State Equation "    RMR ([place] State Equation) 2178.67 --       Modified [place] State Equation    Ve (L/Min) for Modified [place] State Equation Calculation 31 --       Protein Requirements    Weight Used For Protein Calculations 87.1 kg (192 lb) --    Est Protein Requirement Amount (gms/kg) 1.2 gm protein --    Estimated Protein Requirements (gms/day) 104.51 --       Fluid Requirements    Fluid Requirements (mL/day) 2178 --    RDA Method (mL) 2178 --                   Nutrition Prescription Ordered       Row Name 08/18/23 1053          Nutrition Prescription PO    Current PO Diet NPO                    Evaluation of Received Nutrient/Fluid Intake       Row Name 08/18/23 1053          Intake Assessment    Energy/Calorie Requirement Assessment not meeting needs     Protein Requirement Assessment not meeting needs                       Problem/Interventions:   Problem 1       Row Name 08/18/23 1053          Nutrition Diagnoses Problem 1    Problem 1 Needs Alternate     Etiology (related to) Functional Diagnosis;Factors Affecting Nutrition     Functional Diagnosis Other (comment)  intubated/sedated     Signs/Symptoms (evidenced by) NPO;Other (comment)  pt w/ OG-tube and need for enteral nutrition recommendations                          Intervention Goal       Row Name 08/18/23 1054          Intervention Goal    General Maintain nutrition;Nutrition support treatment;Improved nutrition related lab(s);Reduce/improve symptoms;Meet nutritional needs for age/condition;Disease management/therapy     TF/PN Establish TF tolerance;Inititiate TF/PN;Maintain TF/PN;Tolerate TF at goal     Weight Maintain weight                    Nutrition Intervention       Row Name 08/18/23 1054          Nutrition Intervention    RD/Tech Action Recommend/ordered;Adjusted supplement;Follow Tx progress;Care plan reviewd     Recommended/Ordered EN                    Nutrition Prescription       Row Name 08/18/23 1054          Nutrition Prescription PO    New PO  Prescription Ordered? No, recommended        Nutrition Prescription EN    Enteral Prescription Enteral begin/change     Enteral Route OG     Product Peptamen AF     TF Delivery Method Continuous     Continuous TF Goal Rate (mL/hr) 63 mL/hr     Continuous TF Starting Rate (mL/hr) 20 mL/hr     Continuous TF Goal Volume (mL) 1512 mL     Continuous TF Starting Volume (mL) 480 mL     Water flush (mL)  140 mL     Water Flush Frequency Every 4 hours     New EN Prescription Ordered? Yes        Other Orders    Obtain Weight Daily     Obtain Weight Ordered? No, recommended     Supplement Vitamin mineral supplement     Supplement Ordered? No, recommended     Labs Mg++;Na+;Phos;K+     Labs Ordered? Yes                    Education/Evaluation       Row Name 08/18/23 4653          Education    Education Education not appropriate at this time     Please explain Patient sedation status        Monitor/Evaluation    Monitor Per protocol;Pertinent labs;TF delivery/tolerance;Weight;Skin status;Symptoms                     Electronically signed by:  Vikas Gudino RD  08/18/23 10:59 EDT

## 2023-08-18 NOTE — PLAN OF CARE
Goal Outcome Evaluation:                 Patient admitted to ICU this AM, patient unable to follow commands, opening eye spontaneously, HR 90s, normal sinus rhythm, patient requiring mechanical ventilation with fiO2 at 80%, propofol and fentanyl infusing for sedation and pain management.

## 2023-08-19 LAB
A-A DO2: ABNORMAL
ANION GAP SERPL CALCULATED.3IONS-SCNC: 8.9 MMOL/L (ref 5–15)
ARTERIAL PATENCY WRIST A: ABNORMAL
ATMOSPHERIC PRESS: 736 MMHG
BASE EXCESS BLDA CALC-SCNC: -2.8 MMOL/L (ref 0–2)
BASOPHILS # BLD AUTO: 0.06 10*3/MM3 (ref 0–0.2)
BASOPHILS NFR BLD AUTO: 0.5 % (ref 0–1.5)
BDY SITE: ABNORMAL
BUN SERPL-MCNC: 11 MG/DL (ref 6–20)
BUN/CREAT SERPL: 8.8 (ref 7–25)
CALCIUM SPEC-SCNC: 8.2 MG/DL (ref 8.6–10.5)
CHLORIDE SERPL-SCNC: 110 MMOL/L (ref 98–107)
CO2 SERPL-SCNC: 22.1 MMOL/L (ref 22–29)
COHGB MFR BLD: 0.6 % (ref 0–2)
CREAT SERPL-MCNC: 1.25 MG/DL (ref 0.76–1.27)
DEPRECATED RDW RBC AUTO: 44.2 FL (ref 37–54)
EGFRCR SERPLBLD CKD-EPI 2021: 74.7 ML/MIN/1.73
EOSINOPHIL # BLD AUTO: 0.23 10*3/MM3 (ref 0–0.4)
EOSINOPHIL NFR BLD AUTO: 1.8 % (ref 0.3–6.2)
ERYTHROCYTE [DISTWIDTH] IN BLOOD BY AUTOMATED COUNT: 12.7 % (ref 12.3–15.4)
GLUCOSE BLDC GLUCOMTR-MCNC: 127 MG/DL (ref 70–130)
GLUCOSE BLDC GLUCOMTR-MCNC: 92 MG/DL (ref 70–130)
GLUCOSE SERPL-MCNC: 142 MG/DL (ref 65–99)
HCO3 BLDA-SCNC: 24.1 MMOL/L (ref 22–28)
HCT VFR BLD AUTO: 37.1 % (ref 37.5–51)
HCT VFR BLD CALC: 41 %
HGB BLD-MCNC: 12.3 G/DL (ref 13–17.7)
IMM GRANULOCYTES # BLD AUTO: 0.05 10*3/MM3 (ref 0–0.05)
IMM GRANULOCYTES NFR BLD AUTO: 0.4 % (ref 0–0.5)
LYMPHOCYTES # BLD AUTO: 3.1 10*3/MM3 (ref 0.7–3.1)
LYMPHOCYTES NFR BLD AUTO: 24.8 % (ref 19.6–45.3)
MCH RBC QN AUTO: 31.5 PG (ref 26.6–33)
MCHC RBC AUTO-ENTMCNC: 33.2 G/DL (ref 31.5–35.7)
MCV RBC AUTO: 95.1 FL (ref 79–97)
METHGB BLD QL: 0.9 % (ref 0–1.5)
MODALITY: ABNORMAL
MONOCYTES # BLD AUTO: 1.46 10*3/MM3 (ref 0.1–0.9)
MONOCYTES NFR BLD AUTO: 11.7 % (ref 5–12)
NEUTROPHILS NFR BLD AUTO: 60.8 % (ref 42.7–76)
NEUTROPHILS NFR BLD AUTO: 7.61 10*3/MM3 (ref 1.7–7)
NRBC BLD AUTO-RTO: 0 /100 WBC (ref 0–0.2)
OXYHGB MFR BLDV: 97.2 % (ref 94–99)
PCO2 BLDA: 49 MM HG (ref 35–45)
PCO2 TEMP ADJ BLD: ABNORMAL MM[HG]
PH BLDA: 7.3 PH UNITS (ref 7.35–7.45)
PH, TEMP CORRECTED: ABNORMAL
PLATELET # BLD AUTO: 256 10*3/MM3 (ref 140–450)
PMV BLD AUTO: 10 FL (ref 6–12)
PO2 BLDA: 115 MM HG (ref 75–100)
PO2 TEMP ADJ BLD: ABNORMAL MM[HG]
POTASSIUM SERPL-SCNC: 4.3 MMOL/L (ref 3.5–5.2)
PROCALCITONIN SERPL-MCNC: 0.91 NG/ML (ref 0–0.25)
RBC # BLD AUTO: 3.9 10*6/MM3 (ref 4.14–5.8)
SAO2 % BLDCOA: 98.7 % (ref 94–100)
SODIUM SERPL-SCNC: 141 MMOL/L (ref 136–145)
VENTILATOR MODE: ABNORMAL
WBC NRBC COR # BLD: 12.51 10*3/MM3 (ref 3.4–10.8)

## 2023-08-19 PROCEDURE — 25010000002 VANCOMYCIN 5 G RECONSTITUTED SOLUTION: Performed by: INTERNAL MEDICINE

## 2023-08-19 PROCEDURE — 94799 UNLISTED PULMONARY SVC/PX: CPT

## 2023-08-19 PROCEDURE — 83050 HGB METHEMOGLOBIN QUAN: CPT

## 2023-08-19 PROCEDURE — 82805 BLOOD GASES W/O2 SATURATION: CPT

## 2023-08-19 PROCEDURE — 94003 VENT MGMT INPAT SUBQ DAY: CPT

## 2023-08-19 PROCEDURE — 36600 WITHDRAWAL OF ARTERIAL BLOOD: CPT

## 2023-08-19 PROCEDURE — 25010000002 HEPARIN (PORCINE) PER 1000 UNITS: Performed by: FAMILY MEDICINE

## 2023-08-19 PROCEDURE — 85025 COMPLETE CBC W/AUTO DIFF WBC: CPT | Performed by: INTERNAL MEDICINE

## 2023-08-19 PROCEDURE — 80048 BASIC METABOLIC PNL TOTAL CA: CPT | Performed by: INTERNAL MEDICINE

## 2023-08-19 PROCEDURE — 25010000002 PROPOFOL 10 MG/ML EMULSION: Performed by: FAMILY MEDICINE

## 2023-08-19 PROCEDURE — 25010000002 VANCOMYCIN 1 G RECONSTITUTED SOLUTION: Performed by: INTERNAL MEDICINE

## 2023-08-19 PROCEDURE — 82375 ASSAY CARBOXYHB QUANT: CPT

## 2023-08-19 PROCEDURE — 0 AMPICILLIN-SULBACTAM PER 1.5 G: Performed by: INTERNAL MEDICINE

## 2023-08-19 PROCEDURE — 84145 PROCALCITONIN (PCT): CPT | Performed by: INTERNAL MEDICINE

## 2023-08-19 PROCEDURE — 25010000002 KETOROLAC TROMETHAMINE PER 15 MG: Performed by: INTERNAL MEDICINE

## 2023-08-19 PROCEDURE — 99233 SBSQ HOSP IP/OBS HIGH 50: CPT | Performed by: INTERNAL MEDICINE

## 2023-08-19 PROCEDURE — 82948 REAGENT STRIP/BLOOD GLUCOSE: CPT

## 2023-08-19 RX ORDER — LORAZEPAM 2 MG/ML
1 INJECTION INTRAMUSCULAR EVERY 4 HOURS PRN
Status: DISCONTINUED | OUTPATIENT
Start: 2023-08-19 | End: 2023-08-21 | Stop reason: HOSPADM

## 2023-08-19 RX ORDER — AMOXICILLIN 250 MG
2 CAPSULE ORAL 2 TIMES DAILY
Status: DISCONTINUED | OUTPATIENT
Start: 2023-08-19 | End: 2023-08-21 | Stop reason: HOSPADM

## 2023-08-19 RX ORDER — DOCUSATE SODIUM 100 MG/1
100 CAPSULE, LIQUID FILLED ORAL 2 TIMES DAILY
Status: DISCONTINUED | OUTPATIENT
Start: 2023-08-19 | End: 2023-08-19 | Stop reason: SDUPTHER

## 2023-08-19 RX ORDER — KETOROLAC TROMETHAMINE 30 MG/ML
15 INJECTION, SOLUTION INTRAMUSCULAR; INTRAVENOUS EVERY 6 HOURS PRN
Status: DISCONTINUED | OUTPATIENT
Start: 2023-08-19 | End: 2023-08-21 | Stop reason: HOSPADM

## 2023-08-19 RX ORDER — ACETAMINOPHEN 160 MG/5ML
650 SOLUTION ORAL EVERY 6 HOURS PRN
Status: DISCONTINUED | OUTPATIENT
Start: 2023-08-19 | End: 2023-08-21 | Stop reason: HOSPADM

## 2023-08-19 RX ORDER — VANCOMYCIN 2 GRAM/500 ML IN 0.9 % SODIUM CHLORIDE INTRAVENOUS
2000 ONCE
Status: COMPLETED | OUTPATIENT
Start: 2023-08-19 | End: 2023-08-19

## 2023-08-19 RX ORDER — ACETAMINOPHEN 160 MG/5ML
650 SOLUTION ORAL EVERY 6 HOURS PRN
Status: DISCONTINUED | OUTPATIENT
Start: 2023-08-19 | End: 2023-08-19

## 2023-08-19 RX ADMIN — VANCOMYCIN HYDROCHLORIDE 2000 MG: 500 INJECTION, POWDER, LYOPHILIZED, FOR SOLUTION INTRAVENOUS at 16:51

## 2023-08-19 RX ADMIN — SODIUM CHLORIDE 1000 MG: 900 INJECTION, SOLUTION INTRAVENOUS at 23:52

## 2023-08-19 RX ADMIN — SODIUM CHLORIDE 3 G: 900 INJECTION, SOLUTION INTRAVENOUS at 14:33

## 2023-08-19 RX ADMIN — ACETAMINOPHEN 650 MG: 650 SOLUTION ORAL at 07:58

## 2023-08-19 RX ADMIN — PROPOFOL 40 MCG/KG/MIN: 10 INJECTION, EMULSION INTRAVENOUS at 01:23

## 2023-08-19 RX ADMIN — SODIUM CHLORIDE 3 G: 900 INJECTION, SOLUTION INTRAVENOUS at 20:06

## 2023-08-19 RX ADMIN — ACETAMINOPHEN 650 MG: 650 SOLUTION ORAL at 01:38

## 2023-08-19 RX ADMIN — HEPARIN SODIUM 5000 UNITS: 5000 INJECTION INTRAVENOUS; SUBCUTANEOUS at 09:05

## 2023-08-19 RX ADMIN — PROPOFOL 45 MCG/KG/MIN: 10 INJECTION, EMULSION INTRAVENOUS at 06:19

## 2023-08-19 RX ADMIN — ACETAMINOPHEN 650 MG: 325 TABLET, FILM COATED ORAL at 20:06

## 2023-08-19 RX ADMIN — FAMOTIDINE 20 MG: 10 INJECTION INTRAVENOUS at 09:05

## 2023-08-19 RX ADMIN — PHENOL 1 SPRAY: 1.5 LIQUID ORAL at 09:05

## 2023-08-19 RX ADMIN — KETOROLAC TROMETHAMINE 15 MG: 30 INJECTION, SOLUTION INTRAMUSCULAR; INTRAVENOUS at 23:14

## 2023-08-19 RX ADMIN — Medication 10 ML: at 09:05

## 2023-08-19 RX ADMIN — PHENOL 1 SPRAY: 1.5 LIQUID ORAL at 20:09

## 2023-08-19 RX ADMIN — SENNOSIDES AND DOCUSATE SODIUM 2 TABLET: 50; 8.6 TABLET ORAL at 20:06

## 2023-08-19 RX ADMIN — KETOROLAC TROMETHAMINE 15 MG: 30 INJECTION, SOLUTION INTRAMUSCULAR; INTRAVENOUS at 14:24

## 2023-08-19 RX ADMIN — HEPARIN SODIUM 5000 UNITS: 5000 INJECTION INTRAVENOUS; SUBCUTANEOUS at 20:06

## 2023-08-19 RX ADMIN — ACETAMINOPHEN 650 MG: 325 TABLET, FILM COATED ORAL at 11:51

## 2023-08-19 RX ADMIN — Medication 10 ML: at 20:06

## 2023-08-19 RX ADMIN — SODIUM CHLORIDE 3 G: 900 INJECTION, SOLUTION INTRAVENOUS at 09:05

## 2023-08-19 NOTE — PROGRESS NOTES
[facility]    Name:  Ramon Reeves   Age:  38 y.o.  Sex:  male  :  1985  MRN:  [id number]   Visit Number:  [id number]  Admission Date:  2023  Date Of Service:  23  Primary Care Physician:  Provider, No Known     LOS: 1 day :    Chief Complaint:      Altered mental status, agitation     Subjective:    Patient seen and evaluated this morning.  Sedation has been discontinued.  Patient performing spontaneous breathing trial.  Able to follow all commands.  Remained hemodynamically stable.  Successfully extubated to nasal cannula this morning.    Examined again in the afternoon.  Mother at bedside.  Doing well, tolerating oral intake.  No complaints.    Hospital Course:    The patient is an apparent 38-year-old with a history of polysubstance abuse, alcohol use, tobacco use, who had presented via EMS after being found altered mental status and unresponsive.    Per report, EMS had been called out to the home due to patient being found unresponsive by his mother. She had administered up to 12 mg of intranasal Narcan, followed by 2 more milligrams of Narcan via EMS. He had presented apparently in distress, agitated, combative, and severely altered. He was subsequently sedated and intubated and is currently on mechanical ventilation.   His vital signs were demonstrating severe tachycardia, hypertension, and he was febrile up to 102.7. His labs were notable for elevated creatinine of 1.51 CO2 15. INR of 1. Lactic acid was 12. Ethanol of 0.021. White blood cell count 13,000 hemoglobin 16 platelets 270 procalcitonin 0.04. Negative salicylate, acetaminophen. . ABG with pH 6.87 PCO2 of 16.8 PO2 150 bicarb 12.9. Urinalysis with moderate blood and greater than 300 protein. UDS positive for methamphetamine. Patient went multiple imaging modalities including CT scan and pelvis with contrast which was unremarkable. CT maxillofacial without contrast was reportedly  unremarkable. CT scan of the head without contrast was unremarkable. CT scan of the cervical spine was unremarkable.     Review of Systems:     All systems were reviewed and negative except as mentioned in subjective, assessment and plan.    Vital Signs:    Temp:  [98.1 øF (36.7 øC)-102 øF (38.9 øC)] 101.1 øF (38.4 øC)  Heart Rate:  [] 98  Resp:  [15-24] 15  BP: ()/(45-80) 102/59  FiO2 (%):  [40 %] 40 %    Intake and output:    I/O last 3 completed shifts:  In: 5573.9 [I.V.:2413.9; Other:420; NG/GT:690; IV Piggyback:2050]  Out: 2250 [Urine:2250]  I/O this shift:  In: 672 [I.V.:542; Other:30; IV Piggyback:100]  Out: 750 [Urine:750]    Physical Examination:    General Appearance:  Alert and cooperative.    Head:  Atraumatic and normocephalic.   Eyes: Conjunctivae and sclerae normal, no icterus. No pallor.   Throat: No oral lesions, no thrush, oral mucosa moist.   Neck: Supple, trachea midline, no thyromegaly.   Lungs:   Breath sounds heard bilaterally equally.  No wheezing or crackles.    Heart:  Normal S1 and S2, no murmur, No JVD.   Abdomen:   Normal bowel sounds, Soft, nontender, nondistended, no rebound tenderness.   Extremities: Supple, no edema, no cyanosis, no clubbing.   Skin: No bleeding or rash.   Neurologic: Alert and oriented x 3. No facial asymmetry. Moves all four limbs. No tremors.      Laboratory results:    Results from last 7 days   Lab Units 08/19/23  0422 08/18/23  0758 08/18/23  0125   SODIUM mmol/L 141 139 140   POTASSIUM mmol/L 4.3 4.4 4.2   CHLORIDE mmol/L 110* 106 99   CO2 mmol/L 22.1 22.0 15.2*   BUN mg/dL 11 12 12   CREATININE mg/dL 1.25 1.23 1.51*   CALCIUM mg/dL 8.2* 8.8 9.8   BILIRUBIN mg/dL  --  0.2 0.4   ALK PHOS U/L  --  61 83   ALT (SGPT) U/L  --  20 26   AST (SGOT) U/L  --  27 22   GLUCOSE mg/dL 142* 118* 211*     Results from last 7 days   Lab Units 08/19/23  0422 08/18/23  0758 08/18/23  0125   WBC 10*3/mm3 12.51* 19.31* 13.48*   HEMOGLOBIN g/dL 12.3* 14.0 16.0    HEMATOCRIT % 37.1* 40.9 47.6   PLATELETS 10*3/mm3 256 282 270     Results from last 7 days   Lab Units 08/18/23  0125   INR  1.01     Results from last 7 days   Lab Units 08/18/23  0758 08/18/23  0125   CK TOTAL U/L 304* 126     Results from last 7 days   Lab Units 08/18/23  0211 08/18/23  0208   BLOODCX  No growth at 24 hours No growth at 24 hours     Recent Labs     08/18/23  0154 08/18/23  0648 08/19/23  0749   PHART 6.887* 7.341* 7.300*   TAF4YBB 67.8* 43.0 49.0*   PO2ART 150.0* 415.0* 115.0*   YWC3QGN 12.9* 23.2 24.1   BASEEXCESS -21.4* -2.6* -2.8*      I have reviewed the patient's laboratory results.    Radiology results:    CT Abdomen Pelvis Without Contrast    Result Date: 8/18/2023  FINAL REPORT TECHNIQUE: null CLINICAL HISTORY: Sepsis COMPARISON: null FINDINGS: CT abdomen and pelvis without contrast Comparison: None Findings: There is mild bibasilar atelectasis. There is a small calcified granuloma in the left lower lobe. The liver, gallbladder, pancreas, spleen, adrenal glands, and kidneys are unremarkable. There is an OG tube in the stomach. The appendix is normal. Remainder of the gastrointestinal tract is unremarkable. There is no free fluid or fluid collection. There  is no free air. Mcgill catheter is in a decompressed bladder. Aorta is normal diameter. There is no lymphadenopathy. There is mild L5-S1 degenerative disc disease. There is no fracture or suspicious lytic or sclerotic lesion.     Impression: Impression: No acute abnormality in the abdomen or pelvis. Authenticated and Electronically Signed by Benjamin Mckay MD on 08/18/2023 04:09:20 AM    CT Head Without Contrast    Result Date: 8/18/2023  FINAL REPORT TECHNIQUE: null CLINICAL HISTORY: overdose, ams, ?posturing COMPARISON: null FINDINGS: CT head without contrast Comparison: CT/SR - CT MAXILLOFACIAL WO CONT - 08/18/2023 02:19 AM EDT Findings: There are prominent perivascular spaces in the lower basal ganglia. There is no acute  intracranial hemorrhage. Ventricles are of normal size and shape. No mass effect or midline shift is present. The gray-white matter differentiation appears normal. No fractures are identified.     Impression: Impression: No acute intracranial abnormality. Authenticated and Electronically Signed by Benjamin Mckay MD on 08/18/2023 04:00:52 AM    CT Cervical Spine Without Contrast    Result Date: 8/18/2023  FINAL REPORT TECHNIQUE: null CLINICAL HISTORY: fall, unreponsive COMPARISON: null FINDINGS: CT cervical spine without contrast Comparison: None Findings: The alignment is normal. There is no fracture. Disc spaces are preserved. There are multiple small anterior osteophytes. There is no evidence of central canal or neuroforaminal stenosis. There is an endotracheal tube and OG tube.     Impression: Impression: No evidence of cervical spine injury. Authenticated and Electronically Signed by Benjamin Mckay MD on 08/18/2023 04:03:56 AM    XR Chest 1 View    Result Date: 8/18/2023  FINAL REPORT TECHNIQUE: null CLINICAL HISTORY: overdose COMPARISON: null FINDINGS: 1 view chest x-ray Comparison: None Findings: Distal tip of the endotracheal tube is 3.6 cm superior to the jarrod. Enteric tube with distal tip and gastric side port overlying the expected location of the stomach lumen. No consolidation or large pleural effusion. No pneumothorax. Heart size is normal. No acute fracture.     Impression: IMPRESSION: 1. Distal tip of the endotracheal tube is 3.6 cm superior to the jarrod. 2. Enteric tube with distal tip/gastric side port overlying the expected location of the stomach lumen. 3. Clear lungs. Authenticated and Electronically Signed by Fortino Last DO on 08/18/2023 02:10:45 AM    CT Maxillofacial Without Contrast    Result Date: 8/18/2023  FINAL REPORT TECHNIQUE: null CLINICAL HISTORY: fall COMPARISON: null FINDINGS: CT maxillofacial without contrast Comparison: None Findings: There is no facial fracture. There is  no hematoma. Orbits appear normal. Mastoids are clear. There is mucous and mucosal thickening throughout the paranasal sinuses consistent with sinusitis. There is an endotracheal tube and OG tube. There are multiple absent teeth.     Impression: Impression: 1. No facial fracture. 2. Mucous and mucosal thickening throughout the paranasal sinuses consistent with sinusitis. Authenticated and Electronically Signed by Benjamin Mckay MD on 08/18/2023 04:02:31 AM   I have reviewed the patient's radiology reports.    Medication Review:     I have reviewed the patient's active and prn medications.     Problem List:      Altered mental status, unspecified altered mental status type      Assessment:    Altered mental status, POA  Suspected acute methamphetamine overdose, POA  Acute respiratory failure with hypoxia,, POA  Aspiration pneumonia  BIANCA, POA  Lactic acidosis, POA  Metabolic acidosis, POA  Polysubstance abuse  Alcohol abuse    Plan:    Altered mental status/respiratory failure:  Methamphetamine overdose  -Suspect related to methamphetamine toxicity, unsure if any other potential substances involved.    -Successfully extubated 8/19  -Family confirms longstanding history of polysubstance abuse.  Fresh track marks noted on physical exam.    Aspiration pneumonia  -We will start empiric Unasyn  - Procalcitonin elevated, now febrile.  Blood cultures remain negative.  -Continue supplemental oxygen as necessary to maintain saturation greater than 88%.  Currently stable on 2 L nasal cannula.     BIANCA:  Complicated with metabolic acidosis likely due to acute illness/drug toxicity.  Receiving IV fluid resuscitation.  Will avoid nephrotoxic agents.    Strict I's and O's.     Metabolic acidosis/lactic acidosis:  Most likely due to polysubstance abuse and overdose.  Monitor with treatment     Polysubstance use:  Complicates all expected care.    DVT Prophylaxis: Heparin subcu  Code Status: Full  Diet: Regular  Discharge Plan:  Pending    Jomar Jason,   08/19/23  12:39 EDT    Dictated utilizing Dragon dictation.

## 2023-08-19 NOTE — SIGNIFICANT NOTE
KELECHI in unit, I told rep that pt woke up and followed commands, plan to turn sedation off and sbt in the morning. Rep called her supervisor who told her they will not follow pt at this time. Told this RN to call if any changes.

## 2023-08-19 NOTE — PROGRESS NOTES
[facility]    Patient Name:  Ramon Reeves  YOB: 1985  MRN: [id number]  Admit Date:  8/18/2023    Per flowsheets, OG-tube clamped for extubation this a.m., appears it has now been removed. Per RN documentation, Gerard following pt at this time. RD to F/U. Available PRN.     Electronically signed by:  Kenisha Langley RD  08/19/23 10:16 EDT

## 2023-08-19 NOTE — PHARMACY RECOMMENDATION
Pharmacy Consult-Vancomycin Dosing    Ramon Reeves is a  38 y.o. male receiving vancomycin therapy.     Indication: sepsis  Consulting Provider: Dr. CASSIA Jason    Goal AUC: 400 to 600 (mg/L)xhr    Current Antimicrobial Therapy  Anti-Infectives (From admission, onward)      Ordered     Dose/Rate Route Frequency Start Stop    08/19/23 1501  vancomycin 1000 mg/250 mL 0.9% NS (vial-mate)        Ordering Provider: Jomar Jason DO   See Hyperspace for full Linked Orders Report.    1,000 mg  250 mL/hr over 60 Minutes Intravenous Every 12 Hours 08/20/23 0000 08/26/23 1159    08/19/23 1501  vancomycin IVPB 2000 mg in 0.9% Sodium Chloride 500 mL        Ordering Provider: Jomar Jason DO   See Hyperspace for full Linked Orders Report.    2,000 mg  250 mL/hr over 120 Minutes Intravenous Once 08/19/23 1600      08/19/23 1446  Pharmacy to dose vancomycin        Ordering Provider: Jomar Jason DO     Does not apply Continuous PRN 08/19/23 1446 08/26/23 1445    08/19/23 0752  ampicillin-sulbactam 3 g/100 mL 0.9% NS IVPB        Ordering Provider: Jomar Jason DO    3 g  over 30 Minutes Intravenous Every 6 Hours 08/19/23 0845 08/24/23 0844    08/18/23 0155  cefTRIAXone (ROCEPHIN) IVPB 1000 mg/50ml dextrose (premix)        Ordering Provider: Richi Cooney MD    1,000 mg  100 mL/hr over 30 Minutes Intravenous Once 08/18/23 0211 08/18/23 0258            Labs  Results from last 7 days   Lab Units 08/19/23  0422 08/18/23  0758 08/18/23  0125   WBC 10*3/mm3 12.51* 19.31* 13.48*   CREATININE mg/dL 1.25 1.23 1.51*      Estimated Creatinine Clearance: 98.6 mL/min (by C-G formula based on SCr of 1.25 mg/dL).  Temp Readings from Last 1 Encounters:   08/19/23 (!) 100.8 øF (38.2 øC)       Microbiology Culture results  Microbiology Results (last 10 days)       Procedure Component Value - Date/Time    Blood Culture - Blood, Hand, Left [[id number]]  (Normal) Collected: 08/18/23 0211    Lab Status: Preliminary result Specimen:  "Blood from Hand, Left Updated: 08/19/23 0231     Blood Culture No growth at 24 hours    Blood Culture - Blood, Hand, Right [[id number]]  (Normal) Collected: 08/18/23 0208    Lab Status: Preliminary result Specimen: Blood from Hand, Right Updated: 08/19/23 0231     Blood Culture No growth at 24 hours            Evaluation of Dosing     Last Dose Received in the ED/Outside Facility: n/a  Is Patient on Dialysis or Renal Replacement: no    Ht - 190.5 cm (75\")  Wt - 88.7 kg (195 lb 8.8 oz)    Evaluation of Level                      InsightRX AUC Calculation      New dose/frequency: 1000 mg q 12 hrs     New AUC:   514 (mg/L)xhr     New C(trough): 16.6 mcg/mL      Assessment/Plan    Pharmacy to dose vancomycin for sepsis. Goal  to 600 (mg/L)xhr.  Patient receiving loading dose of vancomycin 2000 mg (~22.6 mg/kg) IV on 8/19 at 1600. Initiate maintenance dose of vancomycin 1000 mg (~11.3 mg/kg) IV q 12 hrs on 8/20 at 0000.  Assess clearance by vancomycin random level on 8/20 at 0600.  Pharmacy will continue to monitor renal function, cultures and sensitivities, and clinical status to adjust regimen as necessary.      Thank you for the opportunity to consult on this patient.    Omid Fatima [facility], Pharm.D.  08/19/23  15:04 EDT    "

## 2023-08-19 NOTE — PLAN OF CARE
Goal Outcome Evaluation:   Pt extubated at 0800 after passing sbt. Febrile with max temp 102, prn meds given, temp now 100.1. Mcgill cath removed per order, pt urinated 300 ml in urinal. On room air, no complaints, no distress noted. Family at bedside.

## 2023-08-20 LAB
ANION GAP SERPL CALCULATED.3IONS-SCNC: 7.6 MMOL/L (ref 5–15)
BASOPHILS # BLD AUTO: 0.03 10*3/MM3 (ref 0–0.2)
BASOPHILS NFR BLD AUTO: 0.4 % (ref 0–1.5)
BUN SERPL-MCNC: 9 MG/DL (ref 6–20)
BUN/CREAT SERPL: 7.8 (ref 7–25)
CALCIUM SPEC-SCNC: 8.7 MG/DL (ref 8.6–10.5)
CHLORIDE SERPL-SCNC: 105 MMOL/L (ref 98–107)
CO2 SERPL-SCNC: 26.4 MMOL/L (ref 22–29)
CREAT SERPL-MCNC: 1.15 MG/DL (ref 0.76–1.27)
DEPRECATED RDW RBC AUTO: 42 FL (ref 37–54)
EGFRCR SERPLBLD CKD-EPI 2021: 82.5 ML/MIN/1.73
EOSINOPHIL # BLD AUTO: 0.28 10*3/MM3 (ref 0–0.4)
EOSINOPHIL NFR BLD AUTO: 3.3 % (ref 0.3–6.2)
ERYTHROCYTE [DISTWIDTH] IN BLOOD BY AUTOMATED COUNT: 12.2 % (ref 12.3–15.4)
GLUCOSE SERPL-MCNC: 107 MG/DL (ref 65–99)
HCT VFR BLD AUTO: 37.1 % (ref 37.5–51)
HGB BLD-MCNC: 12.4 G/DL (ref 13–17.7)
IMM GRANULOCYTES # BLD AUTO: 0.03 10*3/MM3 (ref 0–0.05)
IMM GRANULOCYTES NFR BLD AUTO: 0.4 % (ref 0–0.5)
LYMPHOCYTES # BLD AUTO: 2.07 10*3/MM3 (ref 0.7–3.1)
LYMPHOCYTES NFR BLD AUTO: 24.4 % (ref 19.6–45.3)
MCH RBC QN AUTO: 31.4 PG (ref 26.6–33)
MCHC RBC AUTO-ENTMCNC: 33.4 G/DL (ref 31.5–35.7)
MCV RBC AUTO: 93.9 FL (ref 79–97)
MONOCYTES # BLD AUTO: 0.73 10*3/MM3 (ref 0.1–0.9)
MONOCYTES NFR BLD AUTO: 8.6 % (ref 5–12)
MRSA DNA SPEC QL NAA+PROBE: NORMAL
NEUTROPHILS NFR BLD AUTO: 5.36 10*3/MM3 (ref 1.7–7)
NEUTROPHILS NFR BLD AUTO: 62.9 % (ref 42.7–76)
NRBC BLD AUTO-RTO: 0 /100 WBC (ref 0–0.2)
PLATELET # BLD AUTO: 198 10*3/MM3 (ref 140–450)
PMV BLD AUTO: 9.9 FL (ref 6–12)
POTASSIUM SERPL-SCNC: 4.4 MMOL/L (ref 3.5–5.2)
PROCALCITONIN SERPL-MCNC: 0.52 NG/ML (ref 0–0.25)
RBC # BLD AUTO: 3.95 10*6/MM3 (ref 4.14–5.8)
SODIUM SERPL-SCNC: 139 MMOL/L (ref 136–145)
VANCOMYCIN SERPL-MCNC: 16.4 MCG/ML (ref 5–40)
WBC NRBC COR # BLD: 8.5 10*3/MM3 (ref 3.4–10.8)

## 2023-08-20 PROCEDURE — 85025 COMPLETE CBC W/AUTO DIFF WBC: CPT | Performed by: INTERNAL MEDICINE

## 2023-08-20 PROCEDURE — 25010000002 HEPARIN (PORCINE) PER 1000 UNITS: Performed by: INTERNAL MEDICINE

## 2023-08-20 PROCEDURE — 80202 ASSAY OF VANCOMYCIN: CPT | Performed by: INTERNAL MEDICINE

## 2023-08-20 PROCEDURE — 80048 BASIC METABOLIC PNL TOTAL CA: CPT | Performed by: INTERNAL MEDICINE

## 2023-08-20 PROCEDURE — 99232 SBSQ HOSP IP/OBS MODERATE 35: CPT | Performed by: INTERNAL MEDICINE

## 2023-08-20 PROCEDURE — 84145 PROCALCITONIN (PCT): CPT | Performed by: INTERNAL MEDICINE

## 2023-08-20 PROCEDURE — 0 AMPICILLIN-SULBACTAM PER 1.5 G: Performed by: INTERNAL MEDICINE

## 2023-08-20 PROCEDURE — 87641 MR-STAPH DNA AMP PROBE: CPT | Performed by: INTERNAL MEDICINE

## 2023-08-20 PROCEDURE — 25010000002 VANCOMYCIN 1 G RECONSTITUTED SOLUTION: Performed by: INTERNAL MEDICINE

## 2023-08-20 RX ADMIN — SODIUM CHLORIDE 3 G: 900 INJECTION, SOLUTION INTRAVENOUS at 15:59

## 2023-08-20 RX ADMIN — SENNOSIDES AND DOCUSATE SODIUM 2 TABLET: 50; 8.6 TABLET ORAL at 09:36

## 2023-08-20 RX ADMIN — SODIUM CHLORIDE 1000 MG: 900 INJECTION, SOLUTION INTRAVENOUS at 13:39

## 2023-08-20 RX ADMIN — SODIUM CHLORIDE 1000 MG: 900 INJECTION, SOLUTION INTRAVENOUS at 23:00

## 2023-08-20 RX ADMIN — SODIUM CHLORIDE 3 G: 900 INJECTION, SOLUTION INTRAVENOUS at 21:10

## 2023-08-20 RX ADMIN — HEPARIN SODIUM 5000 UNITS: 5000 INJECTION INTRAVENOUS; SUBCUTANEOUS at 21:15

## 2023-08-20 RX ADMIN — SENNOSIDES AND DOCUSATE SODIUM 2 TABLET: 50; 8.6 TABLET ORAL at 21:12

## 2023-08-20 RX ADMIN — SODIUM CHLORIDE 3 G: 900 INJECTION, SOLUTION INTRAVENOUS at 01:47

## 2023-08-20 RX ADMIN — Medication 10 ML: at 09:37

## 2023-08-20 RX ADMIN — SODIUM CHLORIDE 3 G: 900 INJECTION, SOLUTION INTRAVENOUS at 07:47

## 2023-08-20 RX ADMIN — HEPARIN SODIUM 5000 UNITS: 5000 INJECTION INTRAVENOUS; SUBCUTANEOUS at 09:36

## 2023-08-20 RX ADMIN — Medication 10 ML: at 21:08

## 2023-08-20 NOTE — PROGRESS NOTES
[facility]    Name:  Ramon Reeves   Age:  38 y.o.  Sex:  male  :  1985  MRN:  [id number]   Visit Number:  [id number]  Admission Date:  2023  Date Of Service:  23  Primary Care Physician:  Provider, No Known     LOS: 2 days :    Chief Complaint:      Altered mental status, agitation     Subjective:    Patient seen evaluated today.  Was asleep but awoke easily.  Stable on room air.  Tolerating diet well.  Denies complaints.  No acute events overnight per nursing staff.    Hospital Course:    The patient is an apparent 38-year-old with a history of polysubstance abuse, alcohol use, tobacco use, who had presented via EMS after being found altered mental status and unresponsive.    Per report, EMS had been called out to the home due to patient being found unresponsive by his mother. She had administered up to 12 mg of intranasal Narcan, followed by 2 more milligrams of Narcan via EMS. He had presented apparently in distress, agitated, combative, and severely altered. He was subsequently sedated and intubated and is currently on mechanical ventilation.   His vital signs were demonstrating severe tachycardia, hypertension, and he was febrile up to 102.7. His labs were notable for elevated creatinine of 1.51 CO2 15. INR of 1. Lactic acid was 12. Ethanol of 0.021. White blood cell count 13,000 hemoglobin 16 platelets 270 procalcitonin 0.04. Negative salicylate, acetaminophen. . ABG with pH 6.87 PCO2 of 16.8 PO2 150 bicarb 12.9. Urinalysis with moderate blood and greater than 300 protein. UDS positive for methamphetamine. Patient went multiple imaging modalities including CT scan and pelvis with contrast which was unremarkable. CT maxillofacial without contrast was reportedly unremarkable. CT scan of the head without contrast was unremarkable. CT scan of the cervical spine was unremarkable.     Review of Systems:     All systems were reviewed and negative  except as mentioned in subjective, assessment and plan.    Vital Signs:    Temp:  [99.2 øF (37.3 øC)-101.3 øF (38.5 øC)] 99.5 øF (37.5 øC)  Heart Rate:  [] 90  Resp:  [14-18] 15  BP: (102-131)/(59-89) 122/78    Intake and output:    I/O last 3 completed shifts:  In: 2883.9 [P.O.:240; I.V.:1237.9; Other:310; NG/GT:484; IV Piggyback:612]  Out: 3850 [Urine:3850]  I/O this shift:  In: 795 [I.V.:795]  Out: -     Physical Examination: Examined again 8/20/2023    General Appearance:  Alert and cooperative.    Head:  Atraumatic and normocephalic.   Eyes: Conjunctivae and sclerae normal, no icterus. No pallor.   Throat: No oral lesions, no thrush, oral mucosa moist.   Neck: Supple, trachea midline, no thyromegaly.   Lungs:   Breath sounds heard bilaterally equally.  No wheezing or crackles.    Heart:  Normal S1 and S2, no murmur, No JVD.   Abdomen:   Normal bowel sounds, Soft, nontender, nondistended, no rebound tenderness.   Extremities: Supple, no edema, no cyanosis, no clubbing.   Skin: No bleeding or rash.   Neurologic: Alert and oriented x 3. No facial asymmetry. Moves all four limbs. No tremors.      Laboratory results:    Results from last 7 days   Lab Units 08/20/23  0450 08/19/23  0422 08/18/23  0758 08/18/23  0125   SODIUM mmol/L 139 141 139 140   POTASSIUM mmol/L 4.4 4.3 4.4 4.2   CHLORIDE mmol/L 105 110* 106 99   CO2 mmol/L 26.4 22.1 22.0 15.2*   BUN mg/dL 9 11 12 12   CREATININE mg/dL 1.15 1.25 1.23 1.51*   CALCIUM mg/dL 8.7 8.2* 8.8 9.8   BILIRUBIN mg/dL  --   --  0.2 0.4   ALK PHOS U/L  --   --  61 83   ALT (SGPT) U/L  --   --  20 26   AST (SGOT) U/L  --   --  27 22   GLUCOSE mg/dL 107* 142* 118* 211*     Results from last 7 days   Lab Units 08/20/23  0450 08/19/23  0422 08/18/23  0758   WBC 10*3/mm3 8.50 12.51* 19.31*   HEMOGLOBIN g/dL 12.4* 12.3* 14.0   HEMATOCRIT % 37.1* 37.1* 40.9   PLATELETS 10*3/mm3 198 256 282     Results from last 7 days   Lab Units 08/18/23  0125   INR  1.01     Results from  last 7 days   Lab Units 08/18/23  0758 08/18/23  0125   CK TOTAL U/L 304* 126     Results from last 7 days   Lab Units 08/18/23  0211 08/18/23  0208   BLOODCX  No growth at 2 days No growth at 2 days     Recent Labs     08/18/23  0154 08/18/23  0648 08/19/23  0749   PHART 6.887* 7.341* 7.300*   FEM7WMS 67.8* 43.0 49.0*   PO2ART 150.0* 415.0* 115.0*   SPR1GII 12.9* 23.2 24.1   BASEEXCESS -21.4* -2.6* -2.8*      I have reviewed the patient's laboratory results.    Radiology results:    No radiology results from the last 24 hrs  I have reviewed the patient's radiology reports.    Medication Review:     I have reviewed the patient's active and prn medications.     Problem List:      Altered mental status, unspecified altered mental status type      Assessment:    Altered mental status, POA  Suspected acute methamphetamine overdose, POA  Acute respiratory failure with hypoxia,, POA  Aspiration pneumonia  BIANCA, POA  Lactic acidosis, POA  Metabolic acidosis, POA  Polysubstance abuse  Alcohol abuse    Plan:    Altered mental status/respiratory failure:  Methamphetamine overdose  -Suspect related to methamphetamine toxicity, unsure if any other potential substances involved.    -Successfully extubated 8/19  -Family confirms longstanding history of polysubstance abuse.  Fresh track marks noted on physical exam.    Aspiration pneumonia  - Continue Unasyn; added vancomycin due to persistent fevers.  Will discontinue if MRSA screen is negative.  - Procalcitonin trending down.  -Blood cultures negative  -Continue supplemental oxygen as necessary to maintain saturation greater than 88%.  Currently stable on room air.     BIANCA: Improving  Complicated with metabolic acidosis likely due to acute illness/drug toxicity.    Avoid nephrotoxic medication     Metabolic acidosis/lactic acidosis: Resolved  Most likely due to polysubstance abuse and overdose.      Polysubstance use:  Complicates all expected care.    DVT Prophylaxis: Heparin  subcu  Code Status: Full  Diet: Regular  Discharge Plan: Home in 24 to 48-hour    Jomar Jason DO  08/20/23  10:44 EDT    Dictated utilizing Dragon dictation.

## 2023-08-20 NOTE — PLAN OF CARE
Goal Outcome Evaluation: Pt is now med surg as of this morning. IV antibiotics continued. MRSA swab sent, results still pending. Pt up to chair this afternoon. VSS, o2 sat >90% on room air. Family at bedside.

## 2023-08-20 NOTE — PHARMACY RECOMMENDATION
Pharmacy Consult-Vancomycin Dosing    Ramon Reeves is a  38 y.o. male receiving vancomycin therapy.     Indication: sepsis  Consulting Provider: Dr. CASSIA Jason    Goal AUC: 400 to 600 (mg/L)xhr    Current Antimicrobial Therapy  Anti-Infectives (From admission, onward)      Ordered     Dose/Rate Route Frequency Start Stop    08/19/23 1501  vancomycin 1000 mg/250 mL 0.9% NS (vial-mate)        Ordering Provider: Jomar Jason DO   See Hyperspace for full Linked Orders Report.    1,000 mg  250 mL/hr over 60 Minutes Intravenous Every 12 Hours 08/20/23 0000 08/26/23 1159    08/19/23 1501  vancomycin IVPB 2000 mg in 0.9% Sodium Chloride 500 mL        Ordering Provider: Jomar Jason DO   See Hyperspace for full Linked Orders Report.    2,000 mg  250 mL/hr over 120 Minutes Intravenous Once 08/19/23 1600 08/19/23 1851    08/19/23 1446  Pharmacy to dose vancomycin        Ordering Provider: Jomar Jason DO     Does not apply Continuous PRN 08/19/23 1446 08/26/23 1445    08/19/23 0752  ampicillin-sulbactam 3 g/100 mL 0.9% NS IVPB        Ordering Provider: Jomar Jason DO    3 g  over 30 Minutes Intravenous Every 6 Hours 08/19/23 0845 08/24/23 0844    08/18/23 0155  cefTRIAXone (ROCEPHIN) IVPB 1000 mg/50ml dextrose (premix)        Ordering Provider: Richi Cooney MD    1,000 mg  100 mL/hr over 30 Minutes Intravenous Once 08/18/23 0211 08/18/23 0258            Labs  Results from last 7 days   Lab Units 08/20/23  0450 08/19/23  0422 08/18/23  0758   WBC 10*3/mm3 8.50 12.51* 19.31*   CREATININE mg/dL 1.15 1.25 1.23      Estimated Creatinine Clearance: 108.3 mL/min (by C-G formula based on SCr of 1.15 mg/dL).  Temp Readings from Last 1 Encounters:   08/20/23 99.5 øF (37.5 øC) (Temporal)       Microbiology Culture results  Microbiology Results (last 10 days)       Procedure Component Value - Date/Time    Blood Culture - Blood, Hand, Left [[id number]]  (Normal) Collected: 08/18/23 0211    Lab Status: Preliminary  "result Specimen: Blood from Hand, Left Updated: 08/20/23 0230     Blood Culture No growth at 2 days    Blood Culture - Blood, Hand, Right [[id number]]  (Normal) Collected: 08/18/23 0208    Lab Status: Preliminary result Specimen: Blood from Hand, Right Updated: 08/20/23 0230     Blood Culture No growth at 2 days            Evaluation of Dosing     Last Dose Received in the ED/Outside Facility: n/a  Is Patient on Dialysis or Renal Replacement: no    Ht - 190.5 cm (75\")  Wt - 89.7 kg (197 lb 12 oz)    Evaluation of Level    Results from last 7 days   Lab Units 08/20/23  0450   VANCOMYCIN RM mcg/mL 16.40                   InsightRX AUC Calculation     Current dose/frequency: 1000 mg q 12 hrs     Current AUC:   460 (mg/L)xhr     Current C(trough): 15.3 mcg/mL      Assessment/Plan    Pharmacy to dose vancomycin for sepsis. Goal  to 600 (mg/L)xhr.  Continuing vancomycin 1000 mg IV q 12 hrs.  No vancomycin levels needed at this time; will re-evaluate daily.  Pharmacy will continue to monitor renal function, cultures and sensitivities, and clinical status to adjust regimen as necessary.      Thank you for the opportunity to consult on this patient.    Omid Fatima [facility], Pharm.D.  08/20/23  09:59 EDT  "

## 2023-08-21 VITALS
BODY MASS INDEX: 24.62 KG/M2 | HEIGHT: 75 IN | DIASTOLIC BLOOD PRESSURE: 70 MMHG | TEMPERATURE: 98 F | RESPIRATION RATE: 8 BRPM | SYSTOLIC BLOOD PRESSURE: 112 MMHG | WEIGHT: 197.97 LBS | HEART RATE: 69 BPM | OXYGEN SATURATION: 98 %

## 2023-08-21 LAB
ANION GAP SERPL CALCULATED.3IONS-SCNC: 9.9 MMOL/L (ref 5–15)
BUN SERPL-MCNC: 8 MG/DL (ref 6–20)
BUN/CREAT SERPL: 9.3 (ref 7–25)
CALCIUM SPEC-SCNC: 8.7 MG/DL (ref 8.6–10.5)
CHLORIDE SERPL-SCNC: 106 MMOL/L (ref 98–107)
CO2 SERPL-SCNC: 24.1 MMOL/L (ref 22–29)
CREAT SERPL-MCNC: 0.86 MG/DL (ref 0.76–1.27)
DEPRECATED RDW RBC AUTO: 40.3 FL (ref 37–54)
EGFRCR SERPLBLD CKD-EPI 2021: 112.3 ML/MIN/1.73
ERYTHROCYTE [DISTWIDTH] IN BLOOD BY AUTOMATED COUNT: 11.9 % (ref 12.3–15.4)
GLUCOSE SERPL-MCNC: 98 MG/DL (ref 65–99)
HCT VFR BLD AUTO: 38.3 % (ref 37.5–51)
HGB BLD-MCNC: 12.9 G/DL (ref 13–17.7)
MCH RBC QN AUTO: 31 PG (ref 26.6–33)
MCHC RBC AUTO-ENTMCNC: 33.7 G/DL (ref 31.5–35.7)
MCV RBC AUTO: 92.1 FL (ref 79–97)
PLATELET # BLD AUTO: 230 10*3/MM3 (ref 140–450)
PMV BLD AUTO: 9.6 FL (ref 6–12)
POTASSIUM SERPL-SCNC: 3.9 MMOL/L (ref 3.5–5.2)
RBC # BLD AUTO: 4.16 10*6/MM3 (ref 4.14–5.8)
SODIUM SERPL-SCNC: 140 MMOL/L (ref 136–145)
WBC NRBC COR # BLD: 6.63 10*3/MM3 (ref 3.4–10.8)

## 2023-08-21 PROCEDURE — 99239 HOSP IP/OBS DSCHRG MGMT >30: CPT | Performed by: INTERNAL MEDICINE

## 2023-08-21 PROCEDURE — 85027 COMPLETE CBC AUTOMATED: CPT | Performed by: INTERNAL MEDICINE

## 2023-08-21 PROCEDURE — 80048 BASIC METABOLIC PNL TOTAL CA: CPT | Performed by: INTERNAL MEDICINE

## 2023-08-21 PROCEDURE — 0 AMPICILLIN-SULBACTAM PER 1.5 G: Performed by: INTERNAL MEDICINE

## 2023-08-21 RX ORDER — AMOXICILLIN AND CLAVULANATE POTASSIUM 500; 125 MG/1; MG/1
1 TABLET, FILM COATED ORAL 3 TIMES DAILY
Qty: 6 TABLET | Refills: 0 | Status: SHIPPED | OUTPATIENT
Start: 2023-08-21 | End: 2023-08-23

## 2023-08-21 RX ADMIN — Medication 10 ML: at 09:25

## 2023-08-21 RX ADMIN — SODIUM CHLORIDE 3 G: 900 INJECTION, SOLUTION INTRAVENOUS at 09:18

## 2023-08-21 RX ADMIN — SODIUM CHLORIDE 3 G: 900 INJECTION, SOLUTION INTRAVENOUS at 02:13

## 2023-08-21 NOTE — CONSULTS
"Therapist met with the patient at bedside to complete behavioral health assessment. The patient is not agreeable to a full consult but does tell therapist that his recent overdose was not intentional and he was not trying to kill himself. Patient denies SI or history of SI. Patient reports that the day of his recent overdose was the first time he has used heroin since Dec 21, 2023. UDS was positive for methamphetamines. Patient denies seeking further treatment for JESSICA and reports he is already enrolled in a IOP program. Patient is agreeable to receiving outpatient resources.     [place]-SUICIDE SEVERITY RATING SCALE  Psychiatric Inpatient Setting - Discharge Screener    Ask questions that are bold and underlined Discharge   Ask Questions 1 and 2 YES NO   Wish to be Dead:   Person endorses thoughts about a wish to be dead or not alive anymore, or wish to fall asleep and not wake up.  While you were here in the hospital, have you wished you were dead or wished you could go to sleep and not wake up?  x   Suicidal Thoughts:   General non-specific thoughts of wanting to end one's life/die by suicide, "I've thought about killing myself" without general thoughts of ways to kill oneself/associated methods, intent, or plan.   While you were here in the hospital, have you actually had thoughts about killing yourself?   x   If YES to 2, ask questions 3, 4, 5, and 6.  If NO to 2, go directly to question 6   3) Suicidal Thoughts with Method (without Specific Plan or Intent to Act):   Person endorses thoughts of suicide and has thought of a least one method during the assessment period. This is different than a specific plan with time, place or method details worked out. "I thought about taking an overdose but I never made a specific plan as to when where or how I would actually do it..and I would never go through with it."   Have you been thinking about how you might kill yourself?      4) Suicidal Intent (without Specific " "Plan):   Active suicidal thoughts of killing oneself and patient reports having some intent to act on such thoughts, as opposed to "I have the thoughts but I definitely will not do anything about them."   Have you had these thoughts and had some intention of acting on them or do you have some intention of acting on them after you leave the hospital?      5) Suicide Intent with Specific Plan:   Thoughts of killing oneself with details of plan fully or partially worked out and person has some intent to carry it out.   Have you started to work out or worked out the details of how to kill yourself either for while you were here in the hospital or for after you leave the hospital? Do you intend to carry out this plan?        6) Suicide Behavior    While you were here in the hospital, have you done anything, started to do anything, or prepared to do anything to end your life?    Examples: Took pills, cut yourself, tried to hang yourself, took out pills but didn't swallow any because you changed your mind or someone took them from you, collected pills, secured a means of obtaining a gun, gave away valuables, wrote a will or suicide note, etc.  x     "

## 2023-08-21 NOTE — DISCHARGE SUMMARY
[facility]   DISCHARGE SUMMARY      Name:  Ramon Reeves   Age:  38 y.o.  Sex:  male  :  1985  MRN:  [id number]   Visit Number:  [id number]    Admission Date:  2023  Date of Discharge:  2023  Primary Care Physician:  Provider, No Known        Discharge Diagnoses:     Altered mental status,   Acute methamphetamine overdose,   Acute respiratory failure with hypoxia,,   Aspiration pneumonia  BIANCA,   Lactic acidosis,   Metabolic acidosis,   Polysubstance abuse  Alcohol abuse       Problem List:     Active Hospital Problems    Diagnosis  POA    **Altered mental status, unspecified altered mental status type [R41.82]  Yes      Resolved Hospital Problems   No resolved problems to display.     Presenting Problem:    Chief Complaint   Patient presents with    Altered Mental Status      Consults:     Consulting Physician(s)               None            Procedures Performed:        History of presenting illness/Hospital Course:    The patient is an apparent 38-year-old with a history of polysubstance abuse, alcohol use, tobacco use, who had presented via EMS after being found altered mental status and unresponsive.    Per report, EMS had been called out to the home due to patient being found unresponsive by his mother. She had administered up to 12 mg of intranasal Narcan, followed by 2 more milligrams of Narcan via EMS. He had presented apparently in distress, agitated, combative, and severely altered. He was subsequently sedated and intubated and is currently on mechanical ventilation.   His vital signs were demonstrating severe tachycardia, hypertension, and he was febrile up to 102.7. His labs were notable for elevated creatinine of 1.51 CO2 15. INR of 1. Lactic acid was 12. Ethanol of 0.021. White blood cell count 13,000 hemoglobin 16 platelets 270 procalcitonin 0.04. Negative salicylate, acetaminophen. . ABG with pH 6.87 PCO2 of 16.8 PO2 150 bicarb 12.9. Urinalysis with  moderate blood and greater than 300 protein. UDS positive for methamphetamine. Patient went multiple imaging modalities including CT scan and pelvis with contrast which was unremarkable. CT maxillofacial without contrast was reportedly unremarkable. CT scan of the head without contrast was unremarkable. CT scan of the cervical spine was unremarkable.   Patient was treated for methamphetamine overdose.  He was successfully extubated 8/19/2023.  Patient did develop leukocytosis with slight elevation of procalcitonin for which she was started on Unasyn and vancomycin for possible aspiration pneumonia.  Fevers resolved.  MRSA screen negative so vancomycin discontinued.  Blood cultures remain negative.  Leukocytosis resolved, procalcitonin down trended.  Patient transition from Unasyn to Augmentin at discharge to complete 5 days of therapy.  BIANCA improved with IV fluids.  Patient was evaluated by behavioral health services on day of discharge.  Was not interested in further detoxification programs.  Denied suicidal/homicidal ideations.  Patient discharged home with his mother.    Vital Signs:    Temp:  [98 øF (36.7 øC)-99.5 øF (37.5 øC)] 98 øF (36.7 øC)  Heart Rate:  [63-84] 69  Resp:  [8-18] 8  BP: (107-120)/(64-81) 112/70    Physical Exam:    General Appearance:  Alert and cooperative.    Head:  Atraumatic and normocephalic.   Eyes: Conjunctivae and sclerae normal, no icterus. No pallor.   Ears:  Ears with no abnormalities noted.   Throat: No oral lesions, no thrush, oral mucosa moist.   Neck: Supple, trachea midline, no thyromegaly.       Lungs:   Breath sounds heard bilaterally equally.  No crackles or wheezing. No Pleural rub or bronchial breathing.   Heart:  Normal S1 and S2, no murmur, no gallop, no rub. No JVD.   Abdomen:   Normal bowel sounds, no masses, no organomegaly. Soft, nontender, nondistended, no rebound tenderness.   Extremities: Supple, no edema, no cyanosis, no clubbing.   Pulses: Pulses palpable  bilaterally.   Skin: No bleeding or rash.  Multiple tattoos, track marks from recent IV drug use.   Neurologic: Alert and oriented x 3. No facial asymmetry. Moves all four limbs. No tremors.     Pertinent Lab Results:     Results from last 7 days   Lab Units 08/21/23  0735 08/20/23  0450 08/19/23  0422 08/18/23  0758 08/18/23  0125   SODIUM mmol/L 140 139 141 139 140   POTASSIUM mmol/L 3.9 4.4 4.3 4.4 4.2   CHLORIDE mmol/L 106 105 110* 106 99   CO2 mmol/L 24.1 26.4 22.1 22.0 15.2*   BUN mg/dL 8 9 11 12 12   CREATININE mg/dL 0.86 1.15 1.25 1.23 1.51*   CALCIUM mg/dL 8.7 8.7 8.2* 8.8 9.8   BILIRUBIN mg/dL  --   --   --  0.2 0.4   ALK PHOS U/L  --   --   --  61 83   ALT (SGPT) U/L  --   --   --  20 26   AST (SGOT) U/L  --   --   --  27 22   GLUCOSE mg/dL 98 107* 142* 118* 211*     Results from last 7 days   Lab Units 08/21/23  0735 08/20/23  0450 08/19/23  0422   WBC 10*3/mm3 6.63 8.50 12.51*   HEMOGLOBIN g/dL 12.9* 12.4* 12.3*   HEMATOCRIT % 38.3 37.1* 37.1*   PLATELETS 10*3/mm3 230 198 256     Results from last 7 days   Lab Units 08/18/23  0125   INR  1.01     Results from last 7 days   Lab Units 08/18/23  0758 08/18/23  0125   CK TOTAL U/L 304* 126             Results from last 7 days   Lab Units 08/18/23  0125   LIPASE U/L 19     Results from last 7 days   Lab Units 08/19/23  0749   PH, ARTERIAL pH units 7.300*   PO2 ART mm Hg 115.0*   PCO2, ARTERIAL mm Hg 49.0*   HCO3 ART mmol/L 24.1     Results from last 7 days   Lab Units 08/18/23  0211 08/18/23  0208   BLOODCX  No growth at 3 days No growth at 3 days       Pertinent Radiology Results:    Imaging Results (All)       Procedure Component Value Units Date/Time    CT Abdomen Pelvis Without Contrast [[id number]] Collected: 08/18/23 0409     Updated: 08/18/23 0411    Narrative:      FINAL REPORT    TECHNIQUE:  null    CLINICAL HISTORY:  Sepsis    COMPARISON:  null    FINDINGS:  CT abdomen and pelvis without contrast    Comparison: None    Findings:    There is mild  bibasilar atelectasis. There is a small calcified granuloma in the left lower lobe.    The liver, gallbladder, pancreas, spleen, adrenal glands, and kidneys are unremarkable. There is an OG tube in the stomach. The appendix is normal. Remainder of the gastrointestinal tract is unremarkable. There is no free fluid or fluid collection. There   is no free air. Mcgill catheter is in a decompressed bladder. Aorta is normal diameter. There is no lymphadenopathy. There is mild L5-S1 degenerative disc disease. There is no fracture or suspicious lytic or sclerotic lesion.      Impression:      Impression:    No acute abnormality in the abdomen or pelvis.    Authenticated and Electronically Signed by Benjamin Mckay MD on  08/18/2023 04:09:20 AM    CT Cervical Spine Without Contrast [[id number]] Collected: 08/18/23 0403     Updated: 08/18/23 0405    Narrative:      FINAL REPORT    TECHNIQUE:  null    CLINICAL HISTORY:  fall, unreponsive    COMPARISON:  null    FINDINGS:  CT cervical spine without contrast    Comparison: None    Findings:    The alignment is normal. There is no fracture. Disc spaces are preserved. There are multiple small anterior osteophytes. There is no evidence of central canal or neuroforaminal stenosis. There is an endotracheal tube and OG tube.      Impression:      Impression:    No evidence of cervical spine injury.    Authenticated and Electronically Signed by Benjamin Mckay MD on  08/18/2023 04:03:56 AM    CT Maxillofacial Without Contrast [[id number]] Collected: 08/18/23 0402     Updated: 08/18/23 0404    Narrative:      FINAL REPORT    TECHNIQUE:  null    CLINICAL HISTORY:  fall    COMPARISON:  null    FINDINGS:  CT maxillofacial without contrast    Comparison: None    Findings:    There is no facial fracture. There is no hematoma. Orbits appear normal. Mastoids are clear. There is mucous and mucosal thickening throughout the paranasal sinuses consistent with sinusitis. There is an endotracheal tube  and OG tube. There are multiple   absent teeth.      Impression:      Impression:    1. No facial fracture.    2. Mucous and mucosal thickening throughout the paranasal sinuses consistent with sinusitis.    Authenticated and Electronically Signed by Benjamin Mckay MD on  08/18/2023 04:02:31 AM    CT Head Without Contrast [[id number]] Collected: 08/18/23 0400     Updated: 08/18/23 0402    Narrative:      FINAL REPORT    TECHNIQUE:  null    CLINICAL HISTORY:  overdose, ams, ?posturing    COMPARISON:  null    FINDINGS:  CT head without contrast    Comparison: CT/SR - CT MAXILLOFACIAL WO CONT - 08/18/2023 02:19 AM EDT    Findings:    There are prominent perivascular spaces in the lower basal ganglia. There is no acute intracranial hemorrhage. Ventricles are of normal size and shape. No mass effect or midline shift is present. The gray-white matter differentiation appears normal. No   fractures are identified.      Impression:      Impression:    No acute intracranial abnormality.    Authenticated and Electronically Signed by Benjamin Mckay MD on  08/18/2023 04:00:52 AM    XR Chest 1 View [[id number]] Collected: 08/18/23 0210     Updated: 08/18/23 0212    Narrative:      FINAL REPORT    TECHNIQUE:  null    CLINICAL HISTORY:  overdose    COMPARISON:  null    FINDINGS:  1 view chest x-ray    Comparison: None    Findings:    Distal tip of the endotracheal tube is 3.6 cm superior to the jarrod.    Enteric tube with distal tip and gastric side port overlying the expected location of the stomach lumen.    No consolidation or large pleural effusion. No pneumothorax.    Heart size is normal.    No acute fracture.      Impression:      IMPRESSION:    1. Distal tip of the endotracheal tube is 3.6 cm superior to the jarrod.    2. Enteric tube with distal tip/gastric side port overlying the expected location of the stomach lumen.    3. Clear lungs.    Authenticated and Electronically Signed by Fortino Last DO on  08/18/2023  02:10:45 AM            Echo:      Condition on Discharge:      Stable.    Code status during the hospital stay:    Code Status and Medical Interventions:   Ordered at: 08/18/23 0449     Code Status (Patient has no pulse and is not breathing):    CPR (Attempt to Resuscitate)     Medical Interventions (Patient has pulse or is breathing):    Full Support     Discharge Disposition:    Home or Self Care    Discharge Medications:       Discharge Medications        New Medications        Instructions Start Date   amoxicillin-clavulanate 500-125 MG per tablet  Commonly known as: Augmentin   500 mg, Oral, 3 Times Daily             Discharge Diet:     Diet Instructions    Advance as appropriate         Activity at Discharge:     Activity Instructions    As tolerated         Follow-up Appointments:    Additional Instructions for the Follow-ups that You Need to Schedule       Discharge Follow-up with PCP   As directed       Currently Documented PCP:    Provider, No Known    PCP Phone Number:    [phone]     Follow Up Details: establish care - 2-4 weeks               Follow-up Information       Provider, No Known .    Why: establish care - 2-4 weeks  Contact information:  [facility] [address]  [phone]               [facility] Follow up on 10/20/2023.    Why: @11:00  Contact information:  [address] [place] [address] [address] [place] [place] [address]  [phone]                         No future appointments.  Test Results Pending at Discharge:    Pending Labs       Order Current Status    Blood Culture - Blood, Hand, Left Preliminary result    Blood Culture - Blood, Hand, Right Preliminary result               Jomar Jason DO  08/21/23  12:38 EDT    Time: I spent >30 minutes on this discharge activity which included: face-to-face encounter with the patient, reviewing the data in the system, coordination of the care with the nursing staff as well as consultants, documentation, and entering  orders.     Dictated utilizing Dragon dictation.

## 2023-08-21 NOTE — CASE MANAGEMENT/SOCIAL WORK
Case Management Discharge Note           Provided Post Acute Provider List?: N/A  Provided Post Acute Provider Quality & Resource List?: N/A    Selected Continued Care - Admitted Since 8/18/2023       Destination    No services have been selected for the patient.                Durable Medical Equipment    No services have been selected for the patient.                Dialysis/Infusion    No services have been selected for the patient.                Home Medical Care    No services have been selected for the patient.                Therapy    No services have been selected for the patient.                Community Resources    No services have been selected for the patient.                Community & DME    No services have been selected for the patient.                    Transportation Services  Private: Car    Final Discharge Disposition Code: 01 - home or self-care

## 2023-08-23 LAB
BACTERIA SPEC AEROBE CULT: NORMAL
BACTERIA SPEC AEROBE CULT: NORMAL